# Patient Record
Sex: FEMALE | Race: WHITE | Employment: FULL TIME | URBAN - METROPOLITAN AREA
[De-identification: names, ages, dates, MRNs, and addresses within clinical notes are randomized per-mention and may not be internally consistent; named-entity substitution may affect disease eponyms.]

---

## 2018-04-18 ENCOUNTER — HOSPITAL ENCOUNTER (OUTPATIENT)
Age: 53
Discharge: HOME | End: 2018-04-18
Payer: COMMERCIAL

## 2018-04-18 PROCEDURE — G0480 DRUG TEST DEF 1-7 CLASSES: HCPCS

## 2018-04-18 PROCEDURE — 80307 DRUG TEST PRSMV CHEM ANLYZR: CPT

## 2018-04-19 PROBLEM — M48.061 SPINAL STENOSIS OF LUMBAR REGION: Status: ACTIVE | Noted: 2018-04-19

## 2018-04-20 LAB
ALCOHOL URINE: NOT DETECTED MG/DL
AMPHETAMINE SCREEN, URINE: NOT DETECTED
BARBITURATE SCREEN URINE: NOT DETECTED
BENZODIAZEPINE SCREEN, URINE: NOT DETECTED
CANNABINOID SCREEN URINE: NOT DETECTED
COCAINE METABOLITE SCREEN URINE: NOT DETECTED
METHADONE SCREEN, URINE: NOT DETECTED
OPIATE SCREEN URINE: POSITIVE
PHENCYCLIDINE SCREEN URINE: NOT DETECTED
PROPOXYPHENE SCREEN: NOT DETECTED

## 2018-04-26 LAB
6AM URINE: <10 NG/ML
CODEINE, URINE: 2301 NG/ML
HYDROCODONE, URINE: 28 NG/ML
HYDROMORPHONE, URINE: <20 NG/ML
MORPHINE URINE: 50 NG/ML
NORHYDROCODONE, URINE: 62 NG/ML
NOROXYCODONE, URINE: <20 NG/ML
NOROXYMORPHONE, URINE: <20 NG/ML
O-DESMETHYLTRAMADOL,UR: NEGATIVE
OXYCODONE, URINE CONFIRMATION: <20 NG/ML
OXYMORPHONE, URINE: <20 NG/ML
TRAMADOL, URINE: <50 NG/ML

## 2018-05-08 ENCOUNTER — OFFICE VISIT (OUTPATIENT)
Dept: FAMILY MEDICINE CLINIC | Age: 53
End: 2018-05-08
Payer: COMMERCIAL

## 2018-05-08 VITALS
HEART RATE: 88 BPM | SYSTOLIC BLOOD PRESSURE: 120 MMHG | OXYGEN SATURATION: 97 % | DIASTOLIC BLOOD PRESSURE: 80 MMHG | WEIGHT: 157 LBS | BODY MASS INDEX: 29.66 KG/M2

## 2018-05-08 DIAGNOSIS — Z12.39 SCREENING FOR BREAST CANCER: Primary | ICD-10-CM

## 2018-05-08 DIAGNOSIS — R12 HEARTBURN: ICD-10-CM

## 2018-05-08 DIAGNOSIS — N39.41 URGE INCONTINENCE OF URINE: ICD-10-CM

## 2018-05-08 DIAGNOSIS — I10 ESSENTIAL HYPERTENSION: ICD-10-CM

## 2018-05-08 DIAGNOSIS — M54.5 CHRONIC BILATERAL LOW BACK PAIN, WITH SCIATICA PRESENCE UNSPECIFIED: ICD-10-CM

## 2018-05-08 DIAGNOSIS — G89.29 CHRONIC BILATERAL LOW BACK PAIN, WITH SCIATICA PRESENCE UNSPECIFIED: ICD-10-CM

## 2018-05-08 PROCEDURE — 3017F COLORECTAL CA SCREEN DOC REV: CPT | Performed by: FAMILY MEDICINE

## 2018-05-08 PROCEDURE — 1036F TOBACCO NON-USER: CPT | Performed by: FAMILY MEDICINE

## 2018-05-08 PROCEDURE — 99214 OFFICE O/P EST MOD 30 MIN: CPT | Performed by: FAMILY MEDICINE

## 2018-05-08 PROCEDURE — G8427 DOCREV CUR MEDS BY ELIG CLIN: HCPCS | Performed by: FAMILY MEDICINE

## 2018-05-08 PROCEDURE — G8417 CALC BMI ABV UP PARAM F/U: HCPCS | Performed by: FAMILY MEDICINE

## 2018-05-08 RX ORDER — UREA 20 %
CREAM (GRAM) TOPICAL
Refills: 2 | Status: CANCELLED | OUTPATIENT
Start: 2018-05-08

## 2018-05-08 RX ORDER — FAMOTIDINE 20 MG/1
TABLET, FILM COATED ORAL
Qty: 60 TABLET | Refills: 5 | Status: SHIPPED | OUTPATIENT
Start: 2018-05-08 | End: 2018-11-06 | Stop reason: SDUPTHER

## 2018-05-08 RX ORDER — OXYBUTYNIN CHLORIDE 15 MG/1
15 TABLET, EXTENDED RELEASE ORAL DAILY
Qty: 30 TABLET | Refills: 5 | Status: SHIPPED | OUTPATIENT
Start: 2018-05-08 | End: 2018-10-26 | Stop reason: SDUPTHER

## 2018-05-08 RX ORDER — UREA 20 %
CREAM (GRAM) TOPICAL
Refills: 2 | COMMUNITY
Start: 2018-03-19

## 2018-05-08 RX ORDER — LISINOPRIL 10 MG/1
10 TABLET ORAL DAILY
Qty: 90 TABLET | Refills: 1 | Status: SHIPPED | OUTPATIENT
Start: 2018-05-08

## 2018-05-08 RX ORDER — GABAPENTIN 300 MG/1
300 CAPSULE ORAL 3 TIMES DAILY
Qty: 90 CAPSULE | Refills: 5 | Status: CANCELLED | OUTPATIENT
Start: 2018-05-08 | End: 2018-06-07

## 2018-05-08 RX ORDER — DIPHENOXYLATE HYDROCHLORIDE AND ATROPINE SULFATE 2.5; .025 MG/1; MG/1
TABLET ORAL
Qty: 30 TABLET | Refills: 3 | Status: SHIPPED | OUTPATIENT
Start: 2018-05-08 | End: 2019-05-08

## 2018-10-26 DIAGNOSIS — N39.41 URGE INCONTINENCE OF URINE: ICD-10-CM

## 2018-10-26 RX ORDER — OXYBUTYNIN CHLORIDE 15 MG/1
15 TABLET, EXTENDED RELEASE ORAL DAILY
Qty: 30 TABLET | Refills: 0 | Status: SHIPPED | OUTPATIENT
Start: 2018-10-26 | End: 2018-12-01 | Stop reason: SDUPTHER

## 2018-11-06 DIAGNOSIS — R12 HEARTBURN: ICD-10-CM

## 2018-11-07 RX ORDER — FAMOTIDINE 20 MG/1
TABLET, FILM COATED ORAL
Qty: 60 TABLET | Refills: 0 | Status: SHIPPED | OUTPATIENT
Start: 2018-11-07

## 2018-12-01 DIAGNOSIS — N39.41 URGE INCONTINENCE OF URINE: ICD-10-CM

## 2018-12-03 RX ORDER — OXYBUTYNIN CHLORIDE 15 MG/1
15 TABLET, EXTENDED RELEASE ORAL DAILY
Qty: 30 TABLET | Refills: 0 | Status: SHIPPED | OUTPATIENT
Start: 2018-12-03

## 2019-05-16 ENCOUNTER — APPOINTMENT (OUTPATIENT)
Dept: GENERAL RADIOLOGY | Age: 54
End: 2019-05-16
Payer: COMMERCIAL

## 2019-05-16 ENCOUNTER — HOSPITAL ENCOUNTER (EMERGENCY)
Age: 54
Discharge: HOME | End: 2019-05-16
Payer: COMMERCIAL

## 2019-05-16 VITALS
WEIGHT: 152 LBS | SYSTOLIC BLOOD PRESSURE: 142 MMHG | RESPIRATION RATE: 16 BRPM | HEIGHT: 61 IN | HEART RATE: 79 BPM | TEMPERATURE: 97.7 F | DIASTOLIC BLOOD PRESSURE: 89 MMHG | BODY MASS INDEX: 28.7 KG/M2 | OXYGEN SATURATION: 98 %

## 2019-05-16 DIAGNOSIS — S90.32XA CONTUSION OF LEFT FOOT, INITIAL ENCOUNTER: Primary | ICD-10-CM

## 2019-05-16 PROCEDURE — 99283 EMERGENCY DEPT VISIT LOW MDM: CPT

## 2019-05-16 PROCEDURE — 73630 X-RAY EXAM OF FOOT: CPT

## 2019-05-16 RX ORDER — ACETAMINOPHEN 500 MG
1000 TABLET ORAL EVERY 6 HOURS PRN
Qty: 60 TABLET | Refills: 0 | Status: SHIPPED | OUTPATIENT
Start: 2019-05-16 | End: 2019-05-30

## 2019-05-16 ASSESSMENT — PAIN DESCRIPTION - PAIN TYPE: TYPE: ACUTE PAIN

## 2019-05-16 ASSESSMENT — PAIN SCALES - GENERAL: PAINLEVEL_OUTOF10: 8

## 2019-05-16 ASSESSMENT — PAIN DESCRIPTION - ORIENTATION: ORIENTATION: LEFT

## 2019-05-16 ASSESSMENT — PAIN DESCRIPTION - FREQUENCY: FREQUENCY: CONTINUOUS

## 2019-05-16 ASSESSMENT — PAIN DESCRIPTION - LOCATION: LOCATION: FOOT

## 2019-05-16 ASSESSMENT — PAIN DESCRIPTION - DESCRIPTORS: DESCRIPTORS: ACHING

## 2019-05-16 NOTE — ED PROVIDER NOTES
[facility]     HPI:  5/16/19,   Time: 4:56 PM         Dilip Hutton is a 46 y.o. female presenting to the ED for left foot pain, beginning yesterday ago. The complaint has been constant, mild in severity, and worsened by nothing. States that she Kicked the cabinet last evening with the left foot complaining of pain to the left 5th toe. No obvious swelling or deformity is noted. Pedal pulses are palpable 2+ capillary refill at the 2nd. ROS:   Pertinent positives and negatives are stated within HPI, all other systems reviewed and are negative.  --------------------------------------------- PAST HISTORY ---------------------------------------------  Past Medical History:  has a past medical history of Coccyx pain, Fall, GERD (gastroesophageal reflux disease), Hyperparathyroidism ([address] Hypertension, IBS (irritable bowel syndrome), and OA (osteoarthritis of spine). Past Surgical History:  has a past surgical history that includes Bunionectomy (Bilateral); Colonoscopy ([id number]); Foot surgery (Bilateral); Tubal ligation; Upper gastrointestinal endoscopy (06/15/2017); Cholecystectomy; and Colonoscopy (10/16/2017). Social History:  reports that she has never smoked. She has never used smokeless tobacco. She reports that she drinks alcohol. She reports that she does not use drugs. Family History: family history includes Colon Cancer in her father; High Blood Pressure in her father and mother; Liver Cancer in her father; Other in her father and mother; Prostate Cancer in her father. The patients home medications have been reviewed. Allergies: Anaprox [naproxen]; Motrin [ibuprofen]; and Nsaids    -------------------------------------------------- RESULTS -------------------------------------------------  All laboratory and radiology results have been personally reviewed by myself   LABS:  No results found for this visit on 05/16/19.     RADIOLOGY:  Interpreted by Radiologist.  XR FOOT LEFT (MIN 3

## 2020-10-16 ENCOUNTER — HOSPITAL ENCOUNTER (OUTPATIENT)
Dept: PHYSICAL THERAPY | Age: 55
Setting detail: THERAPIES SERIES
Discharge: HOME | End: 2020-10-16
Payer: COMMERCIAL

## 2020-10-16 PROCEDURE — 97161 PT EVAL LOW COMPLEX 20 MIN: CPT | Performed by: PHYSICAL THERAPIST

## 2020-10-16 ASSESSMENT — PAIN DESCRIPTION - PAIN TYPE: TYPE: CHRONIC PAIN

## 2020-10-16 ASSESSMENT — PAIN DESCRIPTION - LOCATION: LOCATION: BACK;LEG

## 2020-10-16 ASSESSMENT — PAIN DESCRIPTION - ORIENTATION: ORIENTATION: RIGHT;LEFT

## 2020-10-16 ASSESSMENT — PAIN DESCRIPTION - PROGRESSION: CLINICAL_PROGRESSION: GRADUALLY WORSENING

## 2020-10-16 ASSESSMENT — PAIN DESCRIPTION - DESCRIPTORS: DESCRIPTORS: CONSTANT

## 2020-10-16 ASSESSMENT — PAIN SCALES - GENERAL: PAINLEVEL_OUTOF10: 9

## 2020-10-16 NOTE — PROGRESS NOTES
[address] [facility]                Phone: [phone]  Fax: [phone]    Physical Therapy Daily Treatment Note  Date:  10/16/2020    Patient Name:  Mia Roper    :  1965  MRN: [id number]    Restrictions/Precautions:    Diagnosis:  Spinal stenosis of lumbar region  Treatment Diagnosis:    Insurance/Certification information:  [facility]  Referring Physician:  Jailene Sanchez Do  Plan of care signed (Y/N):    Visit# / total visits:  -  Pain level: 910   Time In:  1503  Time Out:  1530    Subjective:  See evaluation    Exercises:  Exercise/Equipment Resistance/Repetitions Other comments    recumbent stepper        Trunk stretch with ball       Sciatic nerve glides       Hamstring stretch       Piriformis stretch       Lower trunk rotation       Pelvic tilts                                                                                              Other Therapeutic Activities:  PT evaluation completed    Home Exercise Program:  N/A    Manual Treatments:  N/A    Modalities:  IFC/HP    Timed Code Treatment Minutes:  0    Total Treatment Minutes:  27    Treatment/Activity Tolerance:  [] Patient tolerated treatment well [] Patient limited by fatigue  [x] Patient limited by pain  [] Patient limited by other medical complications  [] Other:     Prognosis: [] Good [x] Fair  [] Poor    Patient Requires Follow-up: [x] Yes  [] No    Plan:   [] Continue per plan of care [] Alter current plan (see comments)  [x] Plan of care initiated [] Hold pending MD visit [] Discharge  Plan for Next Session:        Electronically signed by:  Alissa Todd, PT [id number]

## 2020-10-16 NOTE — PROGRESS NOTES
Physical Therapy  Initial Assessment  Date: 10/16/2020  Patient Name: Rc Yusuf  MRN: [id number]  : 1965     Subjective   General  Additional Pertinent Hx: Pt with h/o chronic low back pain for many years. Pt reports worsening of symptoms of the past month with pain now radiating down the LEs. Pt reports MRI from about 1 year ago revealed spinal stenosis in the lumbar spine. Referring Practitioner: Shelby Gilliland DO  Referral Date : 10/12/20  Diagnosis: Spinal stenosis of lumbar region  PT Visit Information  PT Insurance Information: [employer]  Subjective  Subjective: Pt reports pain across the low back which radiates down into the B buttock, down the posterior LEs into the toes, and also around to the fron of the thighs. She reports numbness in the B toes as well. She reports poor quality of sleep due to pain. Pain Screening  Patient Currently in Pain: Yes  Pain Assessment  Pain Assessment: 0-10  Pain Level: 9  Pain Type: Chronic pain  Pain Location: Back;Leg  Pain Orientation: Right;Left  Pain Descriptors: Constant  Clinical Progression: Gradually worsening  Vital Signs  Patient Currently in Pain: Yes    Social/Functional History  Social/Functional History  Occupation: Full time employment  Type of occupation: Works at [employer]    Objective     AROM RLE (degrees)  RLE AROM: WFL  AROM LLE (degrees)  LLE AROM : WFL  Spine  Lumbar: WFL except flexion grossly 80°    Strength RLE  Strength RLE: WFL  Strength LLE  Strength LLE: WFL  Strength Other  Other: Core 4-/5     Additional Measures  Flexibility: Pt demonstrates tightness throughout the B hips (hamstrings, gluts, piriformis)  Special Tests: (+) SLR/neural tension test B LE  Other: Increased pain with manual lumbar traction     Assessment   Conditions Requiring Skilled Therapeutic Intervention  Body structures, Functions, Activity limitations: Decreased ROM; Decreased strength;Decreased endurance; Increased pain  Prognosis: Fair  Decision Making: Low Complexity  REQUIRES PT FOLLOW UP: Yes  Activity Tolerance  Activity Tolerance: Patient limited by pain         Plan   Plan  Times per week: 3x/week  Plan weeks: 4-6 weeks  Current Treatment Recommendations: Strengthening, ROM, Endurance Training, Neuromuscular Re-education, Manual Therapy - Soft Tissue Mobilization, Pain Management, Modalities, Patient/Caregiver Education & Training, Home Exercise Program      Goals  Short term goals  Time Frame for Short term goals: 2-3 weeks/6-9 visits  Short term goal 1: Decrease c/o pain to 5-7/10  Short term goal 2: Increase RoM lumbar flexion by 5° to 10°  Short term goal 3: Increase core strength by 1/2 MMt grade  Short term goal 4: Pt will demonstrate good tolerance and compliance to HEP  Long term goals  Time Frame for Long term goals : 4-6 weeks/12-18 visits  Long term goal 1: Decrease c/o pain to 3-4/10  Long term goal 2: Increase ROM lumbar flexion to > 90°  Long term goal 3: Increase flexibility B hips to [facility]  Long term goal 4: Increase core strength to 4+/5  Long term goal 5: Pt will reports improved quality od sleep due to decreased pain  Patient Goals   Patient goals : To decrease pain       Therapy Time   Individual Concurrent Group Co-treatment   Time In 1503         Time Out 1530         Minutes 27         Timed Code Treatment Minutes: [address], [place] [address]  If you have any questions or concerns, please don't hesitate to call.   Thank you for your referral.    Physician Signature:________________________________Date:__________________  By signing above, therapists plan is approved by physician

## 2020-10-19 ENCOUNTER — HOSPITAL ENCOUNTER (OUTPATIENT)
Dept: PHYSICAL THERAPY | Age: 55
Setting detail: THERAPIES SERIES
Discharge: HOME | End: 2020-10-19
Payer: COMMERCIAL

## 2020-10-19 PROCEDURE — 97110 THERAPEUTIC EXERCISES: CPT

## 2020-10-21 ENCOUNTER — HOSPITAL ENCOUNTER (OUTPATIENT)
Dept: PHYSICAL THERAPY | Age: 55
Setting detail: THERAPIES SERIES
Discharge: HOME | End: 2020-10-21
Payer: COMMERCIAL

## 2020-10-21 PROCEDURE — 97110 THERAPEUTIC EXERCISES: CPT

## 2020-10-21 PROCEDURE — G0283 ELEC STIM OTHER THAN WOUND: HCPCS

## 2020-10-23 ENCOUNTER — HOSPITAL ENCOUNTER (OUTPATIENT)
Dept: PHYSICAL THERAPY | Age: 55
Setting detail: THERAPIES SERIES
Discharge: HOME | End: 2020-10-23
Payer: COMMERCIAL

## 2020-10-23 PROCEDURE — 97110 THERAPEUTIC EXERCISES: CPT | Performed by: PHYSICAL THERAPIST

## 2020-10-23 PROCEDURE — G0283 ELEC STIM OTHER THAN WOUND: HCPCS | Performed by: PHYSICAL THERAPIST

## 2020-10-23 NOTE — PROGRESS NOTES
[address] [facility]                Phone: [phone]  Fax: [phone]    Physical Therapy Daily Treatment Note  Date:  10/23/2020    Patient Name:  Gerhard Livingston    :  1965  MRN: [id number]    Restrictions/Precautions:    Diagnosis:  Spinal stenosis of lumbar region  Treatment Diagnosis:    Insurance/Certification information:  [facility]  Referring Physician:  Chris Del Castillo Do  Plan of care signed (Y/N):    Visit# / total visits:  -  Pain level: 9/10   Time In:  1429  Time Out:  1515    Subjective:  Pt reports numbness in B feet today.     Exercises:  Exercise/Equipment Resistance/Repetitions Other comments    recumbent stepper  X 10 mins      Trunk stretch with ball 20 sec x 3 reps       Sciatic nerve glides X 10 reps       Hamstring stretch 20 sec x 3 reps B       Piriformis stretch 20 sec x 3 reps B       Lower trunk rotation 15 sec x 5 reps       Pelvic tilts 5 sec x 10 reps                                                                                              Other Therapeutic Activities:  N/A    Home Exercise Program:  N/A    Manual Treatments:  N/A    Modalities:  IFC/HP x 15 mins to LB    Timed Code Treatment Minutes: 31    Total Treatment Minutes:  46    Treatment/Activity Tolerance:  [] Patient tolerated treatment well [] Patient limited by fatigue  [x] Patient limited by pain  [] Patient limited by other medical complications  [] Other:       Prognosis: [] Good [x] Fair  [] Poor    Patient Requires Follow-up: [x] Yes  [] No    Plan:   [x] Continue per plan of care [] Alter current plan (see comments)  [] Plan of care initiated [] Hold pending MD visit [] Discharge  Plan for Next Session:         Electronically signed by:  Erik Novak, PT [id number]

## 2020-10-26 ENCOUNTER — HOSPITAL ENCOUNTER (OUTPATIENT)
Dept: PHYSICAL THERAPY | Age: 55
Setting detail: THERAPIES SERIES
Discharge: HOME | End: 2020-10-26
Payer: COMMERCIAL

## 2020-10-26 PROCEDURE — 97110 THERAPEUTIC EXERCISES: CPT

## 2020-10-26 PROCEDURE — G0283 ELEC STIM OTHER THAN WOUND: HCPCS

## 2020-10-28 ENCOUNTER — HOSPITAL ENCOUNTER (OUTPATIENT)
Dept: PHYSICAL THERAPY | Age: 55
Setting detail: THERAPIES SERIES
Discharge: HOME | End: 2020-10-28
Payer: COMMERCIAL

## 2020-10-28 PROCEDURE — 97110 THERAPEUTIC EXERCISES: CPT | Performed by: PHYSICAL THERAPIST

## 2020-10-28 PROCEDURE — G0283 ELEC STIM OTHER THAN WOUND: HCPCS | Performed by: PHYSICAL THERAPIST

## 2020-10-30 ENCOUNTER — HOSPITAL ENCOUNTER (OUTPATIENT)
Dept: PHYSICAL THERAPY | Age: 55
Setting detail: THERAPIES SERIES
Discharge: HOME | End: 2020-10-30
Payer: COMMERCIAL

## 2020-10-30 PROCEDURE — G0283 ELEC STIM OTHER THAN WOUND: HCPCS | Performed by: PHYSICAL THERAPIST

## 2020-10-30 PROCEDURE — 97110 THERAPEUTIC EXERCISES: CPT | Performed by: PHYSICAL THERAPIST

## 2020-10-30 NOTE — PROGRESS NOTES
[address] [facility]                Phone: [phone]  Fax: [phone]    Physical Therapy Daily Treatment Note  Date:  10/30/2020    Patient Name:  Cheyenne Brown    :  1965  MRN: [id number]    Restrictions/Precautions:    Diagnosis:  Spinal stenosis of lumbar region  Treatment Diagnosis:    Insurance/Certification information:  [facility]  Referring Physician:  Kishor Bullock Do  Plan of care signed (Y/N):    Visit# / total visits:  -  Pain level: 9/10   Time In:  1429  Time Out:  1520    Subjective:  Pt with no new report.     Exercises:  Exercise/Equipment Resistance/Repetitions Other comments    recumbent stepper X 10 mins      Trunk stretch with ball 20 sec x 3 reps       Sciatic nerve glides X 10 reps       Hamstring stretch 20 sec x 3 reps B       Piriformis stretch 20 sec x 3 reps B       Lower trunk rotation 15 sec x 5 reps       Pelvic tilts 5 sec x 10 reps                                                                                              Other Therapeutic Activities:  N/A    Home Exercise Program:  N/A    Manual Treatments:  N/A    Modalities:  IFC/HP x 15 mins to LB    Timed Code Treatment Minutes: 35    Total Treatment Minutes:  51    Treatment/Activity Tolerance:  [] Patient tolerated treatment well [] Patient limited by fatigue  [x] Patient limited by pain  [] Patient limited by other medical complications  [] Other:       Prognosis: [] Good [x] Fair  [] Poor    Patient Requires Follow-up: [x] Yes  [] No    Plan:   [x] Continue per plan of care [] Alter current plan (see comments)  [] Plan of care initiated [] Hold pending MD visit [] Discharge  Plan for Next Session:         Electronically signed by:  Stephanie Gates, PT [id number]

## 2020-11-03 ENCOUNTER — HOSPITAL ENCOUNTER (OUTPATIENT)
Dept: PHYSICAL THERAPY | Age: 55
Setting detail: THERAPIES SERIES
Discharge: HOME | End: 2020-11-03
Payer: COMMERCIAL

## 2020-11-03 PROCEDURE — G0283 ELEC STIM OTHER THAN WOUND: HCPCS | Performed by: PHYSICAL THERAPIST

## 2020-11-03 PROCEDURE — 97110 THERAPEUTIC EXERCISES: CPT | Performed by: PHYSICAL THERAPIST

## 2020-11-03 NOTE — PROGRESS NOTES
[address] [facility]                Phone: [phone]  Fax: [phone]    Physical Therapy Daily Treatment Note  Date:  11/3/2020    Patient Name:  Yuliet Holman    :  1965  MRN: [id number]    Restrictions/Precautions:    Diagnosis:  Spinal stenosis of lumbar region  Treatment Diagnosis:    Insurance/Certification information:  [facility]  Referring Physician:  Mony Whiting Do  Plan of care signed (Y/N):    Visit# / total visits:  -  Pain level: 9/10   Time In:  1505  Time Out:  150    Subjective:  Pt reports that she is having pain in her right knee, cause unknown.     Exercises:  Exercise/Equipment Resistance/Repetitions Other comments    recumbent stepper X 10 mins      Trunk stretch with ball 20 sec x 3 reps       Sciatic nerve glides X 10 reps       Hamstring stretch 20 sec x 3 reps B       Piriformis stretch 20 sec x 3 reps B       Lower trunk rotation 15 sec x 5 reps       Pelvic tilts 5 sec x 10 reps                                                                                              Other Therapeutic Activities:  N/A    Home Exercise Program:  N/A    Manual Treatments:  N/A    Modalities:  IFC/HP x 15 mins to LB    Timed Code Treatment Minutes:  30    Total Treatment Minutes:  45    Treatment/Activity Tolerance:  [] Patient tolerated treatment well [] Patient limited by fatigue  [x] Patient limited by pain  [] Patient limited by other medical complications  [x] Other: antalgic noted noted today due to c/o knee pain      Prognosis: [] Good [x] Fair  [] Poor    Patient Requires Follow-up: [x] Yes  [] No    Plan:   [x] Continue per plan of care [] Alter current plan (see comments)  [] Plan of care initiated [] Hold pending MD visit [] Discharge  Plan for Next Session:         Electronically signed by:  Armin Felty, PT [id number]

## 2020-11-06 ENCOUNTER — HOSPITAL ENCOUNTER (OUTPATIENT)
Dept: PHYSICAL THERAPY | Age: 55
Setting detail: THERAPIES SERIES
Discharge: HOME | End: 2020-11-06
Payer: COMMERCIAL

## 2020-11-06 PROCEDURE — G0283 ELEC STIM OTHER THAN WOUND: HCPCS | Performed by: PHYSICAL THERAPIST

## 2020-11-06 PROCEDURE — 97110 THERAPEUTIC EXERCISES: CPT | Performed by: PHYSICAL THERAPIST

## 2020-11-06 NOTE — PROGRESS NOTES
[address] [facility]                Phone: [phone]  Fax: [phone]    Physical Therapy Daily Treatment Note  Date:  2020    Patient Name:  Zelalem Granado    :  1965  MRN: [id number]    Restrictions/Precautions:    Diagnosis:  Spinal stenosis of lumbar region  Treatment Diagnosis:    Insurance/Certification information:  [facility]  Referring Physician:  Janee Mckeon Do  Plan of care signed (Y/N):    Visit# / total visits:  -  Pain level: 9/10   Time In:  1430  Time Out:  1520    Subjective:  Pt reports knee still hurts.     Exercises:  Exercise/Equipment Resistance/Repetitions Other comments    recumbent stepper X 10 mins      Trunk stretch with ball 20 sec x 3 reps       Sciatic nerve glides X 10 reps       Hamstring stretch 20 sec x 3 reps B       Piriformis stretch 20 sec x 3 reps B       Lower trunk rotation 15 sec x 5 reps       Pelvic tilts 5 sec x 10 reps                                                                                              Other Therapeutic Activities:  N/A    Home Exercise Program:  N/A    Manual Treatments:  N/A    Modalities:  IFC/HP x 15 mins to LB    Timed Code Treatment Minutes:  35    Total Treatment Minutes:  50    Treatment/Activity Tolerance:  [] Patient tolerated treatment well [] Patient limited by fatigue  [x] Patient limited by pain  [] Patient limited by other medical complications  [] Other:       Prognosis: [] Good [x] Fair  [] Poor    Patient Requires Follow-up: [x] Yes  [] No    Plan:   [x] Continue per plan of care [] Alter current plan (see comments)  [] Plan of care initiated [] Hold pending MD visit [] Discharge  Plan for Next Session:         Electronically signed by:  Jessica Luke, PT [id number]

## 2020-11-11 ENCOUNTER — HOSPITAL ENCOUNTER (OUTPATIENT)
Dept: PHYSICAL THERAPY | Age: 55
Setting detail: THERAPIES SERIES
Discharge: HOME | End: 2020-11-11
Payer: COMMERCIAL

## 2020-11-11 PROCEDURE — G0283 ELEC STIM OTHER THAN WOUND: HCPCS

## 2020-11-11 PROCEDURE — 97110 THERAPEUTIC EXERCISES: CPT

## 2020-11-11 NOTE — PROGRESS NOTES
[address] [facility]                Phone: [phone]  Fax: [phone]    Physical Therapy Daily Treatment Note  Date:  2020    Patient Name:  Mamadou Pool    :  1965  MRN: [id number]    Restrictions/Precautions:    Diagnosis:  Spinal stenosis of lumbar region  Treatment Diagnosis:    Insurance/Certification information:  [facility]  Referring Physician:  Nata Ng Do  Plan of care signed (Y/N):    Visit# / total visits:  10/12-  Pain level: 9/10  L Knee and LBP  Time In:  1424  Time Out:  1512    Subjective:  Pt reports L knee and LBP continue to hinder pt's functional mobility , and endurance.      Exercises:  Exercise/Equipment Resistance/Repetitions Other comments    recumbent stepper X 10 mins Pt declined d/t L knee pain      Trunk stretch with ball 20 sec x 3 reps       Sciatic nerve glides X 10 reps       Hamstring stretch 20 sec x 3 reps B       Piriformis stretch 20 sec x 3 reps B       Lower trunk rotation 15 sec x 5 reps       Pelvic tilts 5 sec x 10 reps                                                                                              Other Therapeutic Activities:  N/A    Home Exercise Program:  N/A    Manual Treatments:  N/A    Modalities:  IFC/HP x 15 mins to LB    Timed Code Treatment Minutes: 32     Total Treatment Minutes: 47     Treatment/Activity Tolerance:  [] Patient tolerated treatment well [] Patient limited by fatigue  [x] Patient limited by pain  [] Patient limited by other medical complications  [] Other:       Prognosis: [] Good [x] Fair  [] Poor    Patient Requires Follow-up: [x] Yes  [] No    Plan:   [x] Continue per plan of care [] Alter current plan (see comments)  [] Plan of care initiated [] Hold pending MD visit [] Discharge  Plan for Next Session:         Electronically signed by:  Jared Warner, PTA [id number]

## 2020-11-13 ENCOUNTER — APPOINTMENT (OUTPATIENT)
Dept: PHYSICAL THERAPY | Age: 55
End: 2020-11-13
Payer: COMMERCIAL

## 2020-11-16 ENCOUNTER — HOSPITAL ENCOUNTER (OUTPATIENT)
Dept: PHYSICAL THERAPY | Age: 55
Setting detail: THERAPIES SERIES
Discharge: HOME | End: 2020-11-16
Payer: COMMERCIAL

## 2020-11-16 PROCEDURE — 97110 THERAPEUTIC EXERCISES: CPT

## 2020-11-16 PROCEDURE — G0283 ELEC STIM OTHER THAN WOUND: HCPCS

## 2020-11-16 NOTE — PROGRESS NOTES
[address] [facility]                Phone: [phone]  Fax: [phone]    Physical Therapy Daily Treatment Note  Date:  2020    Patient Name:  Tosha Cook    :  1965  MRN: [id number]    Restrictions/Precautions:    Diagnosis:  Spinal stenosis of lumbar region  Treatment Diagnosis:    Insurance/Certification information:  [facility]  Referring Physician:  Delphine Ralph Do  Plan of care signed (Y/N):    Visit# / total visits:  -  Pain level: 9/10   LBP  Time In:  1430  Time Out:  1520    Subjective:  Pt reports LBP continue to hinder pt's functional mobility , and endurance. Exercises:  Exercise/Equipment Resistance/Repetitions Other comments    recumbent stepper X 10 mins      Trunk stretch with ball 20 sec x 3 reps       Sciatic nerve glides X 10 reps       Hamstring stretch 20 sec x 3 reps B       Piriformis stretch 20 sec x 3 reps B       Lower trunk rotation 15 sec x 5 reps       Pelvic tilts 5 sec x 10 reps                                                                                              Other :  Functional mobility is guarded d/t pain.  Pt has had no reduction in pain since initial eval.     Home Exercise Program:  Sciatic nerve glides, piriformis stretch , lower trunk rotation    Manual Treatments:  N/A    Modalities:  IFC/HP x 15 mins to LB    Timed Code Treatment Minutes: 35     Total Treatment Minutes: 50    Treatment/Activity Tolerance:  [] Patient tolerated treatment well [] Patient limited by fatigue  [x] Patient limited by pain  [] Patient limited by other medical complications  [] Other:       Prognosis: [] Good [x] Fair  [] Poor    Patient Requires Follow-up: [x] Yes  [] No    Plan:   [x] Continue per plan of care [] Alter current plan (see comments)  [] Plan of care initiated [] Hold pending MD visit [] Discharge    Plan for Next Session:   Will finish out appointments for the the week and discharge w/ HEP for management of symptoms Electronically signed by:  Kiesha Aranda, PTA [id number]

## 2020-11-17 ENCOUNTER — HOSPITAL ENCOUNTER (OUTPATIENT)
Dept: PHYSICAL THERAPY | Age: 55
Setting detail: THERAPIES SERIES
Discharge: HOME | End: 2020-11-17
Payer: COMMERCIAL

## 2020-11-17 NOTE — PROGRESS NOTES
[address] [facility]                Phone: [phone]  Fax: [phone]    Physical Therapy  Cancellation/No-show Note  Patient Name:  Regi Dalal  :  1965   Date:  2020    For today's appointment patient:  [x]  Cancelled  []  Rescheduled appointment  []  No-show     Reason given by patient:  []  Patient ill  []  Conflicting appointment  []  No transportation    []  Conflict with work  []  No reason given  [x]  Other:     Comments:  Pt had concerns about the weather condition, pt takes the bus and has a distance to walk from the drop off point  Electronically signed by:  Dorothy Ryan PTA [id number]

## 2020-11-18 ENCOUNTER — HOSPITAL ENCOUNTER (OUTPATIENT)
Dept: PHYSICAL THERAPY | Age: 55
Setting detail: THERAPIES SERIES
Discharge: HOME | End: 2020-11-18
Payer: COMMERCIAL

## 2020-11-18 PROCEDURE — G0283 ELEC STIM OTHER THAN WOUND: HCPCS

## 2020-11-18 PROCEDURE — 97110 THERAPEUTIC EXERCISES: CPT

## 2020-11-18 NOTE — PROGRESS NOTES
and discharge w/ HEP for management of symptoms. Pt is in agreement with this course of action.  She reported she has a follow up w/ pain management Dr. Farideh Ellis on December 2, 2020         Electronically signed by:  Erich Foy, PTA [id number]

## 2020-11-23 ENCOUNTER — HOSPITAL ENCOUNTER (OUTPATIENT)
Dept: PHYSICAL THERAPY | Age: 55
Setting detail: THERAPIES SERIES
Discharge: HOME | End: 2020-11-23
Payer: COMMERCIAL

## 2020-11-23 PROCEDURE — 97110 THERAPEUTIC EXERCISES: CPT

## 2020-11-23 PROCEDURE — G0283 ELEC STIM OTHER THAN WOUND: HCPCS

## 2020-11-23 NOTE — PROGRESS NOTES
[address] [facility]                Phone: [phone]  Fax: [phone]    Physical Therapy Daily Treatment Note  Date:  2020    Patient Name:  Lander Libman    :  1965  MRN: [id number]    Restrictions/Precautions:    Diagnosis:  Spinal stenosis of lumbar region  Treatment Diagnosis:    Insurance/Certification information:  [facility]  Referring Physician:  Mark Scott Do  Plan of care signed (Y/N):    Visit# / total visits:  -  Pain level: 9/10   LBP and L knee ( non radiating )   Time In:  1429  Time Out:  1525    Subjective:  Pt continues to report LBP continue to hinder pt's functional mobility , and endurance. Pt arrived after her work shift. Exercises:  Exercise/Equipment Resistance/Repetitions Other comments    recumbent stepper X 10 mins      Trunk stretch with ball 20 sec x 3 reps       Sciatic nerve glides X 10 reps       Hamstring stretch 20 sec x 3 reps B       Piriformis stretch 20 sec x 3 reps B       Lower trunk rotation 15 sec x 5 reps       Pelvic tilts 5 sec x 10 reps                                                                                              Other :  Functional mobility is guarded d/t pain.  Pt has had no reduction in pain since initial eval.     Home Exercise Program:  Sciatic nerve glides, piriformis stretch , lower trunk rotation    Manual Treatments:  N/A    Modalities:  IFC/HP x 15 mins to LB    Timed Code Treatment Minutes: 41    Total Treatment Minutes: 56    Treatment/Activity Tolerance:  [] Patient tolerated treatment well [] Patient limited by fatigue  [x] Patient limited by pain  [] Patient limited by other medical complications  [] Other:       Prognosis: [] Good [x] Fair  [] Poor    Patient Requires Follow-up: [x] Yes  [] No    Plan:   [x] Continue per plan of care [] Alter current plan (see comments)  [] Plan of care initiated [] Hold pending MD visit [] Discharge    Plan for Next Session:   Will finish one additional appointment next and discharge w/ HEP for management of symptoms. Pt is in agreement with this course of action.  She reported she has a follow up w/ pain management Dr. Jules Pineda on December 2, 2020         Electronically signed by:  Victorino Hernandes, PTA [id number]

## 2020-11-25 ENCOUNTER — HOSPITAL ENCOUNTER (OUTPATIENT)
Dept: PHYSICAL THERAPY | Age: 55
Setting detail: THERAPIES SERIES
Discharge: HOME | End: 2020-11-25
Payer: COMMERCIAL

## 2020-11-25 NOTE — PROGRESS NOTES
[address] [facility]                Phone: [phone]  Fax: [phone]    Physical Therapy  Cancellation/No-show Note  Patient Name:  Xander Jefferson  :  1965   Date:  2020    For today's appointment patient:  [x]  Cancelled  []  Rescheduled appointment  []  No-show     Reason given by patient:  []  Patient ill  []  Conflicting appointment  []  No transportation    []  Conflict with work  []  No reason given  []  Other:     Comments:  Had too much to do to get ready for Thanksgiving    Electronically signed by:  Sulma Chavez PTA [id number]

## 2020-11-30 ENCOUNTER — HOSPITAL ENCOUNTER (EMERGENCY)
Age: 55
Discharge: HOME | End: 2020-11-30
Attending: EMERGENCY MEDICINE
Payer: COMMERCIAL

## 2020-11-30 ENCOUNTER — APPOINTMENT (OUTPATIENT)
Dept: CT IMAGING | Age: 55
End: 2020-11-30
Payer: COMMERCIAL

## 2020-11-30 VITALS
DIASTOLIC BLOOD PRESSURE: 63 MMHG | TEMPERATURE: 97.8 F | SYSTOLIC BLOOD PRESSURE: 120 MMHG | OXYGEN SATURATION: 98 % | HEART RATE: 71 BPM | RESPIRATION RATE: 20 BRPM

## 2020-11-30 LAB
ALBUMIN SERPL-MCNC: 4.1 G/DL (ref 3.5–5.2)
ALP BLD-CCNC: 124 U/L (ref 35–104)
ALT SERPL-CCNC: 66 U/L (ref 0–32)
ANION GAP SERPL CALCULATED.3IONS-SCNC: 12 MMOL/L (ref 7–16)
AST SERPL-CCNC: 68 U/L (ref 0–31)
BACTERIA: ABNORMAL /HPF
BASOPHILS ABSOLUTE: 0.04 E9/L (ref 0–0.2)
BASOPHILS RELATIVE PERCENT: 0.5 % (ref 0–2)
BILIRUB SERPL-MCNC: 0.3 MG/DL (ref 0–1.2)
BILIRUBIN URINE: NEGATIVE
BLOOD, URINE: NEGATIVE
BUN BLDV-MCNC: 10 MG/DL (ref 6–20)
CALCIUM SERPL-MCNC: 9.3 MG/DL (ref 8.6–10.2)
CHLORIDE BLD-SCNC: 102 MMOL/L (ref 98–107)
CLARITY: CLEAR
CO2: 29 MMOL/L (ref 22–29)
COLOR: YELLOW
CREAT SERPL-MCNC: 0.6 MG/DL (ref 0.5–1)
EOSINOPHILS ABSOLUTE: 0.17 E9/L (ref 0.05–0.5)
EOSINOPHILS RELATIVE PERCENT: 1.9 % (ref 0–6)
GFR AFRICAN AMERICAN: >60
GFR NON-AFRICAN AMERICAN: >60 ML/MIN/1.73
GLUCOSE BLD-MCNC: 82 MG/DL (ref 74–99)
GLUCOSE URINE: NEGATIVE MG/DL
HCT VFR BLD CALC: 43 % (ref 34–48)
HEMOGLOBIN: 13.7 G/DL (ref 11.5–15.5)
IMMATURE GRANULOCYTES #: 0.05 E9/L
IMMATURE GRANULOCYTES %: 0.6 % (ref 0–5)
KETONES, URINE: NEGATIVE MG/DL
LACTIC ACID: 0.8 MMOL/L (ref 0.5–2.2)
LEUKOCYTE ESTERASE, URINE: ABNORMAL
LIPASE: 12 U/L (ref 13–60)
LYMPHOCYTES ABSOLUTE: 2.43 E9/L (ref 1.5–4)
LYMPHOCYTES RELATIVE PERCENT: 27.8 % (ref 20–42)
MCH RBC QN AUTO: 29.5 PG (ref 26–35)
MCHC RBC AUTO-ENTMCNC: 31.9 % (ref 32–34.5)
MCV RBC AUTO: 92.7 FL (ref 80–99.9)
MONOCYTES ABSOLUTE: 0.69 E9/L (ref 0.1–0.95)
MONOCYTES RELATIVE PERCENT: 7.9 % (ref 2–12)
NEUTROPHILS ABSOLUTE: 5.36 E9/L (ref 1.8–7.3)
NEUTROPHILS RELATIVE PERCENT: 61.3 % (ref 43–80)
NITRITE, URINE: NEGATIVE
PDW BLD-RTO: 12.6 FL (ref 11.5–15)
PH UA: 5.5 (ref 5–9)
PLATELET # BLD: 233 E9/L (ref 130–450)
PMV BLD AUTO: 10.4 FL (ref 7–12)
POTASSIUM SERPL-SCNC: 3.5 MMOL/L (ref 3.5–5)
PROTEIN UA: NEGATIVE MG/DL
RBC # BLD: 4.64 E12/L (ref 3.5–5.5)
RBC UA: ABNORMAL /HPF (ref 0–2)
SODIUM BLD-SCNC: 143 MMOL/L (ref 132–146)
SPECIFIC GRAVITY UA: 1.02 (ref 1–1.03)
TOTAL PROTEIN: 7.7 G/DL (ref 6.4–8.3)
UROBILINOGEN, URINE: 0.2 E.U./DL
WBC # BLD: 8.7 E9/L (ref 4.5–11.5)
WBC UA: ABNORMAL /HPF (ref 0–5)

## 2020-11-30 PROCEDURE — 6360000002 HC RX W HCPCS: Performed by: EMERGENCY MEDICINE

## 2020-11-30 PROCEDURE — 2580000003 HC RX 258: Performed by: EMERGENCY MEDICINE

## 2020-11-30 PROCEDURE — 96374 THER/PROPH/DIAG INJ IV PUSH: CPT

## 2020-11-30 PROCEDURE — 74177 CT ABD & PELVIS W/CONTRAST: CPT

## 2020-11-30 PROCEDURE — 80053 COMPREHEN METABOLIC PANEL: CPT

## 2020-11-30 PROCEDURE — 2580000003 HC RX 258: Performed by: INTERNAL MEDICINE

## 2020-11-30 PROCEDURE — 83690 ASSAY OF LIPASE: CPT

## 2020-11-30 PROCEDURE — 6360000004 HC RX CONTRAST MEDICATION: Performed by: INTERNAL MEDICINE

## 2020-11-30 PROCEDURE — 99283 EMERGENCY DEPT VISIT LOW MDM: CPT

## 2020-11-30 PROCEDURE — 85025 COMPLETE CBC W/AUTO DIFF WBC: CPT

## 2020-11-30 PROCEDURE — 83605 ASSAY OF LACTIC ACID: CPT

## 2020-11-30 PROCEDURE — 81001 URINALYSIS AUTO W/SCOPE: CPT

## 2020-11-30 RX ORDER — SODIUM CHLORIDE 0.9 % (FLUSH) 0.9 %
10 SYRINGE (ML) INJECTION ONCE
Status: COMPLETED | OUTPATIENT
Start: 2020-11-30 | End: 2020-11-30

## 2020-11-30 RX ORDER — FENTANYL CITRATE 50 UG/ML
50 INJECTION, SOLUTION INTRAMUSCULAR; INTRAVENOUS ONCE
Status: COMPLETED | OUTPATIENT
Start: 2020-11-30 | End: 2020-11-30

## 2020-11-30 RX ORDER — 0.9 % SODIUM CHLORIDE 0.9 %
500 INTRAVENOUS SOLUTION INTRAVENOUS ONCE
Status: COMPLETED | OUTPATIENT
Start: 2020-11-30 | End: 2020-11-30

## 2020-11-30 RX ADMIN — Medication 10 ML: at 19:33

## 2020-11-30 RX ADMIN — IOPAMIDOL 90 ML: 755 INJECTION, SOLUTION INTRAVENOUS at 19:33

## 2020-11-30 RX ADMIN — SODIUM CHLORIDE 500 ML: 9 INJECTION, SOLUTION INTRAVENOUS at 18:39

## 2020-11-30 RX ADMIN — FENTANYL CITRATE 50 MCG: 50 INJECTION, SOLUTION INTRAMUSCULAR; INTRAVENOUS at 18:39

## 2020-11-30 ASSESSMENT — PAIN SCALES - GENERAL: PAINLEVEL_OUTOF10: 6

## 2020-11-30 NOTE — ED NOTES
FIRST PROVIDER CONTACT ASSESSMENT NOTE      Department of Emergency Medicine   ED  First Provider Note   11/30/20  4:10 PM EST    Chief Complaint: Abdominal Pain (LLQ abd pain starting yesterday)      History of Present Illness:    Anjelica Byrd is a 63 y.o. female who presents to the ED by private car for left lower quadrant abdominal pain starting yesterday. Patient has a history of her gallbladder being removed which is her only surgery on her belly. Patient states she has had no vaginal bleeding  Focused Screening Exam:  Constitutional:  Alert, appears stated age and is in no distress. *ALLERGIES*     Anaprox [naproxen];  Motrin [ibuprofen]; and Nsaids     ED Triage Vitals   BP Temp Temp src Pulse Resp SpO2 Height Weight   11/30/20 1606 11/30/20 1424 -- 11/30/20 1424 11/30/20 1605 11/30/20 1424 -- --   (!) 142/95 97.8 °F (36.6 °C)  105 16 94 %          Initial Plan of Care:  Initiate Treatment-Testing, Proceed toTreatment Area When Bed Available for ED Attending/MLP to Continue Care    -----------------END OF FIRST PROVIDER CONTACT ASSESSMENT NOTE--------------  Electronically signed by Edgar Martínez PA-C   DD: 11/30/20       Edgar Martínez PA-C  11/30/20 4535

## 2020-11-30 NOTE — ED PROVIDER NOTES
[facility]  Department of Emergency Medicine   ED  Encounter Note  Admit Date/RoomTime: 2020  5:31 PM  ED Room: ED-SCR/SCR    NAME: Rip Vang  : 1965  MRN: [id number]    Chief Complaint: Abdominal Pain (LLQ abd pain starting yesterday)       History of Present Illness        Rip Vang is a 63 y.o. old female who presents to the emergency department for evaluation of left lower quadrant abdominal pain. It began yesterday. She denies any associated nausea, vomiting, diarrhea or constipation. She has no rectal bleeding. She denies  symptoms such as vaginal discharge, hematuria or dysuria. She has no prior history of diverticulosis or kidney stone. She does have remote history of cholecystectomy and thinks that she did have a colonoscopy as well with Dr. Larry Holbrook. I cannot find any records for a colonoscopy in our system. She denies fever or chills. She has had no recent trauma or heavy lifting. Norberto Ra ROS   Pertinent positives and negatives are stated within HPI, all other systems reviewed and are negative. Past Medical History:  has a past medical history of Coccyx pain, Fall, GERD (gastroesophageal reflux disease), Hyperparathyroidism ([address] Hypertension, IBS (irritable bowel syndrome), and OA (osteoarthritis of spine). Surgical History has a past surgical history that includes Bunionectomy (Bilateral); Colonoscopy ([id number]); Foot surgery (Bilateral); Tubal ligation; Upper gastrointestinal endoscopy (06/15/2017); Cholecystectomy; and Colonoscopy (10/16/2017). Social History:  reports that she has never smoked. She has never used smokeless tobacco. She reports current alcohol use. She reports that she does not use drugs. Family History: family history includes Colon Cancer in her father; High Blood Pressure in her father and mother; Liver Cancer in her father; Other in her father and mother; Prostate Cancer in her father.      Allergies: Anaprox [naproxen]; Motrin [ibuprofen]; and Nsaids    Physical Exam            ED Triage Vitals   BP Temp Temp src Pulse Resp SpO2 Height Weight   11/30/20 1606 11/30/20 1424 -- 11/30/20 1424 11/30/20 1605 11/30/20 1424 -- --   (!) 142/95 97.8 °F (36.6 °C)  105 16 94 %        Oxygen Saturation Interpretation: Normal.    General Appearance/Constitutional:  Alert, development consistent with age  [de-identified]:  NC/NT. PERRLA. Airway patent. Neck:  Supple. No lymphadenopathy. Respiratory:  No retractions. Lungs Clear to auscultation and breath sounds equal.  CV:  Regular rate and rhythm. GI:  normal appearing, non-distended with no visible hernias. Bowel sounds: normal bowel sounds. Tenderness: There is moderate tenderness present - located in the LLQ., There is no guarding. .    No inguinal hernia, no zoster rash         Liver: non-tender. Spleen:  non-tender. Back: CVA Tenderness: No.  Integument:  Normal turgor. Warm, dry, without visible rash, unless noted elsewhere. Lymphatics: No edema, cap.refill <3sec. Neurological:  Orientation age-appropriate. Motor functions intact.     Lab / Imaging Results   (All laboratory and radiology results have been personally reviewed by myself)  Labs:  Results for orders placed or performed during the hospital encounter of 11/30/20   Comprehensive Metabolic Panel   Result Value Ref Range    Sodium 143 132 - 146 mmol/L    Potassium 3.5 3.5 - 5.0 mmol/L    Chloride 102 98 - 107 mmol/L    CO2 29 22 - 29 mmol/L    Anion Gap 12 7 - 16 mmol/L    Glucose 82 74 - 99 mg/dL    BUN 10 6 - 20 mg/dL    CREATININE 0.6 0.5 - 1.0 mg/dL    GFR Non-African American >60 >=60 mL/min/1.73    GFR African American >60     Calcium 9.3 8.6 - 10.2 mg/dL    Total Protein 7.7 6.4 - 8.3 g/dL    Alb 4.1 3.5 - 5.2 g/dL    Total Bilirubin 0.3 0.0 - 1.2 mg/dL    Alkaline Phosphatase 124 (H) 35 - 104 U/L    ALT 66 (H) 0 - 32 U/L    AST 68 (H) 0 - 31 U/L   CBC Auto Differential   Result Value Ref Range    WBC 8.7 4.5 - 11.5 E9/L    RBC 4.64 3.50 - 5.50 E12/L    Hemoglobin 13.7 11.5 - 15.5 g/dL    Hematocrit 43.0 34.0 - 48.0 %    MCV 92.7 80.0 - 99.9 fL    MCH 29.5 26.0 - 35.0 pg    MCHC 31.9 (L) 32.0 - 34.5 %    RDW 12.6 11.5 - 15.0 fL    Platelets 710 780 - 628 E9/L    MPV 10.4 7.0 - 12.0 fL    Neutrophils % 61.3 43.0 - 80.0 %    Immature Granulocytes % 0.6 0.0 - 5.0 %    Lymphocytes % 27.8 20.0 - 42.0 %    Monocytes % 7.9 2.0 - 12.0 %    Eosinophils % 1.9 0.0 - 6.0 %    Basophils % 0.5 0.0 - 2.0 %    Neutrophils Absolute 5.36 1.80 - 7.30 E9/L    Immature Granulocytes # 0.05 E9/L    Lymphocytes Absolute 2.43 1.50 - 4.00 E9/L    Monocytes Absolute 0.69 0.10 - 0.95 E9/L    Eosinophils Absolute 0.17 0.05 - 0.50 E9/L    Basophils Absolute 0.04 0.00 - 0.20 E9/L   Lipase   Result Value Ref Range    Lipase 12 (L) 13 - 60 U/L   Urinalysis with Microscopic   Result Value Ref Range    Color, UA Yellow Straw/Yellow    Clarity, UA Clear Clear    Glucose, Ur Negative Negative mg/dL    Bilirubin Urine Negative Negative    Ketones, Urine Negative Negative mg/dL    Specific Gravity, UA 1.020 1.005 - 1.030    Blood, Urine Negative Negative    pH, UA 5.5 5.0 - 9.0    Protein, UA Negative Negative mg/dL    Urobilinogen, Urine 0.2 <2.0 E.U./dL    Nitrite, Urine Negative Negative    Leukocyte Esterase, Urine SMALL (A) Negative    WBC, UA 2-5 0 - 5 /HPF    RBC, UA 0-1 0 - 2 /HPF    Bacteria, UA RARE (A) None Seen /HPF   Lactic Acid, Plasma   Result Value Ref Range    Lactic Acid 0.8 0.5 - 2.2 mmol/L     Imaging: All Radiology results interpreted by Radiologist unless otherwise noted.   CT ABDOMEN PELVIS W IV CONTRAST Additional Contrast? None   Final Result   No acute intra-abdominal process          ED Course / Medical Decision Making     Medications   0.9 % sodium chloride bolus (0 mLs Intravenous Stopped 11/30/20 1909)   fentaNYL (SUBLIMAZE) injection 50 mcg (50 mcg Intravenous Given 11/30/20 1839)   iopamidol (ISOVUE-370) 76 % injection 90 mL (90 mLs Intravenous Given 11/30/20 1933)   sodium chloride flush 0.9 % injection 10 mL (10 mLs Intravenous Given 11/30/20 1933)        Re-Evaluations:  11/30/20      Patients symptoms are improving. Consultations:             None    Procedures:   none    MDM: Patient presents to the ED for evaluation of left lower quadrant abdominal pain. She was given fentanyl with improvement of her symptoms. Laboratory studies were reassuring. Patient had mild transaminitis which was discussed with her but she has absolutely no right upper quadrant pain. Urinalysis was negative for UTI or blood. CT abdomen pelvis obtained showed no acute intra-abdominal process. Patient advised that possibly, this could be musculoskeletal pain. If symptoms do not improve, she was advised to follow-up with her surgeon. She does have a scheduled help appoint with her family doctor tomorrow. She states that she already takes Tylenol with codeine and is any pain management contract, therefore no additional pain prescriptions were written. Patient is comfortable with discharge at this time, strict return precautions discussed. Counseling:   I reviewed today's visit with the patient in addition to providing specific details for the plan of care and counseling regarding the diagnosis and prognosis. Questions are answered at this time and are agreeable with the plan. Assessment      1. Abdominal pain, left lower quadrant    2. Transaminitis      This patient's ED course included: a personal history and physicial examination  This patient has remained hemodynamically stable during their ED course. Plan   Discharge to home. Patient condition is stable. New Medications     Discharge Medication List as of 11/30/2020  8:28 PM        Electronically signed by Davida Gamble DO   DD: 11/30/20  **This report was transcribed using voice recognition software.  Every effort was made to ensure accuracy; however, inadvertent computerized transcription errors may be present.   END OF PROVIDER NOTE        Maryellen Ramon DO  11/30/20 2125

## 2020-12-02 ENCOUNTER — HOSPITAL ENCOUNTER (OUTPATIENT)
Dept: PHYSICAL THERAPY | Age: 55
Setting detail: THERAPIES SERIES
Discharge: HOME | End: 2020-12-02
Payer: COMMERCIAL

## 2020-12-02 NOTE — PROGRESS NOTES
[address] [facility]                Phone: [phone]  Fax: [phone]    Physical Therapy Daily Treatment Note  Date:  2020    Patient Name:  Meir Lozano    :  1965  MRN: [id number]    Restrictions/Precautions:    Diagnosis:  Spinal stenosis of lumbar region  Treatment Diagnosis:    Insurance/Certification information:  [facility]  Referring Physician:  Cruz Domingo Do  Plan of care signed (Y/N):    Visit# / total visits:    Pain level: 10/10   LBP and L knee ( non radiating )   Time In:  1420  Time Out:  1431    Subjective:  Pt continues to report LBP continue to hinder pt's functional mobility ,  endurance , and interrupts her sleep. Pt was in the ER on 2020, for a new issue: pain lower L abdomin that causes \" shooting pain 10/10 \" across her abdomin to the R lower abdominal area. She reported she is still awaiting testing and US to diagnose this new issue. She declined to perform her therapy for her LB. She does not feel she has had any improvement in her symptoms since therapy began. Exercises:  Exercise/Equipment Resistance/Repetitions Other comments                                                                                                         Other :  Functional mobility is guarded d/t pain. Pt has had no reduction in pain since initial eval.     Home Exercise Program:  Sciatic nerve glides, piriformis stretch , lower trunk rotation. 2020 : pt was provided w/ and reviewed inclusive HEP except for recumbent stepper.  Pt reported understanding HEP     Manual Treatments:  N/A    Modalities:      Timed Code Treatment Minutes: 0    Total Treatment Minutes: 11    Treatment/Activity Tolerance:  [] Patient tolerated treatment well [] Patient limited by fatigue  [x] Patient limited by pain  [x] Patient limited by other medical complications  [] Other:       Prognosis: [] Good [x] Fair  [] Poor    Patient Requires Follow-up: [x] Yes  [] No    Plan:   [] Continue per plan of care [] Alter current plan (see comments)  [] Plan of care initiated [] Hold pending MD visit [x] Discharge    Plan for Next Session:         Electronically signed by:  Criss Ferrer, LAZARA [id number]

## 2020-12-16 NOTE — DISCHARGE SUMMARY
[address] [facility]                 Phone: [phone]  Fax: [phone]    Physical Therapy  Out Patient Discharge Summary     Date:  2020    Patient Name:  Jenna Chan    :  1965  MRN: [id number]    DIAGNOSIS:  Spinal stenosis of lumbar region  REFERRING PHYSICIAN:  Mustapha Jonas [address]     ATTENDANCE:  Pt has attended 14 of 14 scheduled treatments from 10/16/20 to 20. TREATMENTS RECEIVED:  Stretching, core stabilization, E-stim, HP    INITIAL STATUS:  · Pain in low back which radiated down B LEs 9/10  · ROM lumbar flexion grossly 80°  · Tightness noted in B hips (hamstrings, gluts, piriformis)  · Core strength 4-/5  · Pt reported poor quality of sleep due to severity of pain    FINAL STATUS:  · Low back pain 10/10  · ROM lumbar flexion unchanged since initial evaluation  · Tightness noted throughout B hips, ROM remains guarded  · Core strength 4- to 4/5  · Pt continues to have difficulty with sleep due to pain    GOALS:  0 out of 5 Long Term Goals were obtained. LONG TERM GOALS NOT OBTAINED/REASON:  Pt condition did not respond to therapeutic interventions. PATIENT GOALS:  To decrease pain    REASON FOR DISCHARGE:  Pt has received maximum benefit from physical therapy    PATIENT EDUCATION/INSTRUCTIONS:  Pt educated in stretches, nerve glides, and core stabilization activities for HEP. RECOMMENDATIONS:  Discharge from physical therapy with recommendation to follow-up with physician due to continued severity of pain. Thank you for the opportunity to work with your patient. If you have questions or comments, please feel free to contact me by phone or fax.       Electronically Signed by: Deanna Rebolledo, PT [id number]  2020

## 2022-05-28 ENCOUNTER — HOSPITAL ENCOUNTER (EMERGENCY)
Age: 57
Discharge: HOME | End: 2022-05-28
Payer: COMMERCIAL

## 2022-05-28 ENCOUNTER — APPOINTMENT (OUTPATIENT)
Dept: GENERAL RADIOLOGY | Age: 57
End: 2022-05-28
Payer: COMMERCIAL

## 2022-05-28 VITALS
HEIGHT: 61 IN | HEART RATE: 62 BPM | RESPIRATION RATE: 16 BRPM | OXYGEN SATURATION: 97 % | BODY MASS INDEX: 34.74 KG/M2 | TEMPERATURE: 97.1 F | DIASTOLIC BLOOD PRESSURE: 78 MMHG | WEIGHT: 184 LBS | SYSTOLIC BLOOD PRESSURE: 134 MMHG

## 2022-05-28 DIAGNOSIS — S80.812A ABRASION OF LEFT LOWER EXTREMITY, INITIAL ENCOUNTER: ICD-10-CM

## 2022-05-28 DIAGNOSIS — M25.422 ELBOW EFFUSION, LEFT: Primary | ICD-10-CM

## 2022-05-28 DIAGNOSIS — S20.212A CONTUSION OF RIB ON LEFT SIDE, INITIAL ENCOUNTER: ICD-10-CM

## 2022-05-28 PROCEDURE — 73110 X-RAY EXAM OF WRIST: CPT

## 2022-05-28 PROCEDURE — 99283 EMERGENCY DEPT VISIT LOW MDM: CPT

## 2022-05-28 PROCEDURE — 29105 APPLICATION LONG ARM SPLINT: CPT

## 2022-05-28 PROCEDURE — 73502 X-RAY EXAM HIP UNI 2-3 VIEWS: CPT

## 2022-05-28 PROCEDURE — 71101 X-RAY EXAM UNILAT RIBS/CHEST: CPT

## 2022-05-28 PROCEDURE — 73560 X-RAY EXAM OF KNEE 1 OR 2: CPT

## 2022-05-28 PROCEDURE — 73610 X-RAY EXAM OF ANKLE: CPT

## 2022-05-28 PROCEDURE — 73070 X-RAY EXAM OF ELBOW: CPT

## 2022-05-28 PROCEDURE — 6370000000 HC RX 637 (ALT 250 FOR IP): Performed by: PHYSICIAN ASSISTANT

## 2022-05-28 PROCEDURE — 73030 X-RAY EXAM OF SHOULDER: CPT

## 2022-05-28 RX ORDER — HYDROCODONE BITARTRATE AND ACETAMINOPHEN 5; 325 MG/1; MG/1
1 TABLET ORAL ONCE
Status: COMPLETED | OUTPATIENT
Start: 2022-05-28 | End: 2022-05-28

## 2022-05-28 RX ADMIN — HYDROCODONE BITARTRATE AND ACETAMINOPHEN 1 TABLET: 5; 325 TABLET ORAL at 13:08

## 2022-05-28 ASSESSMENT — PAIN - FUNCTIONAL ASSESSMENT
PAIN_FUNCTIONAL_ASSESSMENT: ACTIVITIES ARE NOT PREVENTED
PAIN_FUNCTIONAL_ASSESSMENT: 0-10
PAIN_FUNCTIONAL_ASSESSMENT: 0-10

## 2022-05-28 ASSESSMENT — PAIN SCALES - GENERAL
PAINLEVEL_OUTOF10: 10
PAINLEVEL_OUTOF10: 7

## 2022-05-28 ASSESSMENT — PAIN DESCRIPTION - DESCRIPTORS: DESCRIPTORS: ACHING;BURNING;DISCOMFORT

## 2022-05-28 ASSESSMENT — PAIN DESCRIPTION - ORIENTATION: ORIENTATION: LEFT

## 2022-05-28 ASSESSMENT — PAIN DESCRIPTION - LOCATION: LOCATION: ARM;LEG;HIP

## 2022-05-28 NOTE — ED NOTES
Discharge discussed w/ Patient. Pain management and follow up reviewed at this time with no questions.        Jesenia Robert, FABIÁN  05/28/22 [id number]

## 2022-05-28 NOTE — Clinical Note
Abdulkadir Gee was seen and treated in our emergency department on 5/28/2022. She may return to work on 06/04/2022. If you have any questions or concerns, please don't hesitate to call.       VENANCIO Carroll

## 2022-05-31 ENCOUNTER — TELEPHONE (OUTPATIENT)
Dept: ORTHOPEDIC SURGERY | Age: 57
End: 2022-05-31

## 2022-05-31 NOTE — TELEPHONE ENCOUNTER
ED 5/28/22 TTS on call  ED for injuries sustained from falling down approximately 5 steps, beginning this morning. The complaint has been persistent, moderate in severity, and worsened by movement of left arm and left leg as well as deep inspiration. RADIOLOGY:  Interpreted by Radiologist.  XR SHOULDER LEFT (MIN 2 VIEWS)   Final Result   No acute bony abnormality.           XR RIBS LEFT INCLUDE CHEST (MIN 3 VIEWS)   Final Result   No acute cardiopulmonary disease. No definite left-sided rib fracture   present.           XR KNEE LEFT (1-2 VIEWS)   Final Result   No acute bony abnormality.           XR HIP 2-3 VW W PELVIS LEFT   Final Result   No acute bony abnormality.           XR ELBOW LEFT (2 VIEWS)   Final Result   Evidence of a joint effusion raising the concern for an occult fracture. No   fracture line visualized with degenerative changes seen within the elbow. Consider repeat imaging in 7 tendon days if clinical symptoms persist.           XR ANKLE LEFT (MIN 3 VIEWS)   Final Result   No acute bony abnormality.           XR WRIST LEFT (MIN 3 VIEWS)   Final Result   No acute bony abnormality. Routing to providers for scheduling rec.

## 2022-05-31 NOTE — TELEPHONE ENCOUNTER
Patient was seen at [facility] on 5/28 for Elbow effusion, left; Contusion of rib on left side, initial encounter; Abrasion of left lower extremity, initial encounter  was advised to follow up with Dr Nuno Spann. Please contact the patient to schedule.

## 2022-06-02 NOTE — TELEPHONE ENCOUNTER
Call back from  pt scheduled pt. Future Appointments   Date Time Provider Blas Devine   6/8/2022  9:45 AM May Stewart MD [facility]   7/26/2022 10:00 AM Mick Peters MD [facility]     Pt indicated needs RTW letter to excuse her till appt. RTW needs faxed to [employer] on [facility]# [phone]. Fax to #[phone] c/o [address] [place] [address] Dept.

## 2022-06-08 ENCOUNTER — OFFICE VISIT (OUTPATIENT)
Dept: ORTHOPEDIC SURGERY | Age: 57
End: 2022-06-08
Payer: COMMERCIAL

## 2022-06-08 ENCOUNTER — HOSPITAL ENCOUNTER (OUTPATIENT)
Dept: GENERAL RADIOLOGY | Age: 57
Discharge: HOME | End: 2022-06-10
Payer: COMMERCIAL

## 2022-06-08 VITALS — HEIGHT: 61 IN | BODY MASS INDEX: 34.74 KG/M2 | WEIGHT: 184 LBS

## 2022-06-08 DIAGNOSIS — S59.902A INJURY OF LEFT ELBOW, INITIAL ENCOUNTER: Primary | ICD-10-CM

## 2022-06-08 DIAGNOSIS — S52.125A NONDISPLACED FRACTURE OF HEAD OF LEFT RADIUS, INITIAL ENCOUNTER FOR CLOSED FRACTURE: ICD-10-CM

## 2022-06-08 DIAGNOSIS — S59.902A INJURY OF LEFT ELBOW, INITIAL ENCOUNTER: ICD-10-CM

## 2022-06-08 PROCEDURE — 99202 OFFICE O/P NEW SF 15 MIN: CPT

## 2022-06-08 PROCEDURE — 3017F COLORECTAL CA SCREEN DOC REV: CPT | Performed by: PHYSICIAN ASSISTANT

## 2022-06-08 PROCEDURE — 99203 OFFICE O/P NEW LOW 30 MIN: CPT | Performed by: PHYSICIAN ASSISTANT

## 2022-06-08 PROCEDURE — 1036F TOBACCO NON-USER: CPT | Performed by: PHYSICIAN ASSISTANT

## 2022-06-08 PROCEDURE — G8427 DOCREV CUR MEDS BY ELIG CLIN: HCPCS | Performed by: PHYSICIAN ASSISTANT

## 2022-06-08 PROCEDURE — 99202 OFFICE O/P NEW SF 15 MIN: CPT | Performed by: ORTHOPAEDIC SURGERY

## 2022-06-08 PROCEDURE — G8417 CALC BMI ABV UP PARAM F/U: HCPCS | Performed by: PHYSICIAN ASSISTANT

## 2022-06-08 PROCEDURE — 73080 X-RAY EXAM OF ELBOW: CPT

## 2022-06-08 NOTE — PATIENT INSTRUCTIONS
Recommend limited usage of left upper extremity with no heavy lifting, pushing or pulling    Patient was given a form to return to work on 6/13/2022. Sling for comfort when out. Okay to remove sling when home to work on gentle range of motion of the elbow, wrist and digits. Follow-up in 4 weeks for reevaluation and x-rays. Call if any questions or concerns. Patient Education        Elbow: Exercises  Introduction  Here are some examples of exercises for you to try. The exercises may be suggested for a condition or for rehabilitation. Start each exercise slowly. Ease off the exercises if you start to have pain. You will be told when to start these exercises and which ones will work bestfor you. How to do the exercises  Wrist flexor stretch    1. Extend your arm in front of you with your palm up. 2. Bend your wrist, pointing your hand toward the floor. 3. With your other hand, gently bend your wrist farther until you feel a mild to moderate stretch in your forearm. 4. Hold for at least 15 to 30 seconds. Repeat 2 to 4 times. Wrist extensor stretch    1. Repeat steps 1 to 4 of the stretch above but begin with your extended hand palm down. Ball or sock squeeze    1. Hold a tennis ball (or a rolled-up sock) in your hand. 2. Make a fist around the ball (or sock) and squeeze. 3. Hold for about 6 seconds, and then relax for up to 10 seconds. 4. Repeat 8 to 12 times. 5. Switch the ball (or sock) to your other hand and do 8 to 12 times. Wrist deviation    1. Sit so that your arm is supported but your hand hangs off the edge of a flat surface, such as a table. 2. Hold your hand out like you are shaking hands with someone. 3. Move your hand up and down. 4. Repeat this motion 8 to 12 times. 5. Switch arms. 6. Try to do this exercise twice with each hand. Wrist curls    1. Place your forearm on a table with your hand hanging over the edge of the table, palm up.   2. Place a 1- to 2-pound weight in your hand. This may be a dumbbell, a can of food, or a filled water bottle. 3. Slowly raise and lower the weight while keeping your forearm on the table and your palm facing up. 4. Repeat this motion 8 to 12 times. 5. Switch arms, and do steps 1 through 4.  6. Repeat with your hand facing down toward the floor. Switch arms. Biceps curls    1. Sit leaning forward with your legs slightly spread and your left hand on your left thigh. 2. Place your right elbow on your right thigh, and hold the weight with your forearm horizontal.  3. Slowly curl the weight up and toward your chest.  4. Repeat this motion 8 to 12 times. 5. Switch arms, and do steps 1 through 4. Follow-up care is a key part of your treatment and safety. Be sure to make and go to all appointments, and call your doctor if you are having problems. It's also a good idea to know your test results and keep alist of the medicines you take. Where can you learn more? Go to https://[employer]. org and sign in to your [employer] account. Enter M279 in the [employer] box to learn more about \"Elbow: Exercises. \"     If you do not have an account, please click on the \"Sign Up Now\" link. Current as of: July 1, 2021               Content Version: 13.2  © 9928-6075 Healthwise, Incorporated. Care instructions adapted under license by [facility] ([facility]). If you have questions about a medical condition or this instruction, always ask your healthcare professional. [place] [address] any warranty or liability for your use of this information.

## 2022-06-08 NOTE — LETTER
[address]  [address]  [address] [facility] [address]  Phone: [phone]  Fax: [phone]      June 8, 2022     Patient: Xander Jefferson   YOB: 1965   Date of Visit: 6/8/2022       To Whom It May Concern: It is my medical opinion that Nohemi Cloud may return to work on 6-13-22 without restrictions. .    If you have any questions or concerns, please don't hesitate to call.     Sincerely,        VENANCIO Macdonald

## 2022-06-08 NOTE — PROGRESS NOTES
epicondyle. She was placed into a splint and sling and sent here for follow-up. She states that the pain and swelling has gone down in her arm. She works at [employer] and states that she feels she would be ready to return to work next week. No other orthopedic complaints at this time. Review of Systems   Constitutional: Negative for fever, chills, diaphoresis, appetite change and fatigue. HENT: Negative for dental issues, hearing loss and tinnitus. Negative for congestion, sinus pressure, sneezing, sore throat. Negative for headache. Eyes: Negative for visual disturbance, blurred and double vision. Negative for pain, discharge, redness and itching  Respiratory: Negative for cough, shortness of breath and wheezing. Cardiovascular: Negative for chest pain, palpitations and leg swelling. No dyspnea on exertion   Gastrointestinal:   Negative for nausea, vomiting, abdominal pain, diarrhea, constipation  or black or bloody. Hematologic\Lymphatic:  negative for bleeding, petechiae,   Genitourinary: Negative for hematuria and difficulty urinating. Musculoskeletal: Negative for neck pain and stiffness. Negative for back pain, see HPI  Skin: Negative for pallor, rash and wound. Neurological: Negative for dizziness, tremors, seizures, weakness, light-headedness, no TIA or stroke symptoms. No numbness and headaches. Psychiatric/Behavioral: Negative. OBJECTIVE:      Physical Examination:   General appearance: alert, well appearing, and in no distress,  normal appearing weight.  No visible signs of trauma   Mental status: alert, oriented to person, place, and time, normal mood, behavior, speech, dress, motor activity, and thought processes  Abdomen: soft, nondistended  Resp:   resp easy and unlabored, no audible wheezes note, normal symmetrical expansion of both hemithoraces  Cardiac: distal pulses palpable, skin and extremities well perfused  Neurological: alert, oriented X3, normal speech, no focal findings or movement disorder noted, motor and sensory grossly normal bilaterally, normal muscle tone, no tremors, strength 5/5, normal gait and station  HEENT: normochephalic atraumatic, external ears and eyes normal, sclera normal, neck supple, no nasal discharge. Extremities:   peripheral pulses normal, no edema, redness or tenderness in the calves   Skin: normal coloration, no rashes or open wounds, no suspicious skin lesions noted  Psych: Affect euthymic   Musculoskeletal:   Extremity:  Left Upper Extremity  Skin is clean dry and intact  Mild edema noted  Radial pulse palpable, fingers warm with BCR  Flex/extension intact to wrist, thumb and fingers  Finger opposition intact  Finger adduction/abduction intact  Finger crossover intact  Subjectively states sensation intact to radial/medial/ulnar distribution  Moderate tenderness over the radial head  Elbow arc of motion 3135 without pain  No block with supination/pronation  Good motion of the wrist and digits    Ht 5' 1\" (1.549 m)   Wt 184 lb (83.5 kg)   LMP 07/27/2013   BMI 34.77 kg/m²      XR: 6/8/22   2 views left elbow:  FINDINGS:   Right elbow joint effusion appears diminished.  No direct visualization of an   underlying fracture.  An occult fracture remains likely.           Impression   Diminishing right elbow joint effusion.  No direct visualization of an   underlying fracture.  See above.             ASSESSMENT:   Diagnosis Orders   1. Injury of left elbow, initial encounter  XR ELBOW LEFT (MIN 3 VIEWS)   2. Nondisplaced fracture of head of left radius, initial encounter for closed fracture       Discussion: Had lengthy discussion with patient regarding Her diagnosis, typical prognosis, and expected outcomes. I reviewed the possible complications from the injury itself despite treatment choosen. I also discussed treatment options including nonoperative managements versus surgical management, along with risks and benefits of each.  They have elected for nonoperative management at this time. Discussed with the patient that x-rays did not show a definitive fracture although there is a fat pad sign and she is moderately tender over the radial head indicative of a likely nondisplaced fracture. PLAN:  X-rays reviewed and discussed. Recommend limited usage of left upper extremity with no heavy lifting, pushing or pulling    Patient was given a form to return to work on 6/13/2022. Sling for comfort when out. Okay to remove sling when home to work on gentle range of motion of the elbow, wrist and digits. Follow-up in 4 weeks for reevaluation and x-rays. Call if any questions or concerns. Patient Education     Elbow: Exercises  Introduction  Here are some examples of exercises for you to try. The exercises may be suggested for a condition or for rehabilitation. Start each exercise slowly. Ease off the exercises if you start to have pain. You will be told when to start these exercises and which ones will work bestfor you. How to do the exercises  Wrist flexor stretch    1. Extend your arm in front of you with your palm up. 2. Bend your wrist, pointing your hand toward the floor. 3. With your other hand, gently bend your wrist farther until you feel a mild to moderate stretch in your forearm. 4. Hold for at least 15 to 30 seconds. Repeat 2 to 4 times. Wrist extensor stretch    1. Repeat steps 1 to 4 of the stretch above but begin with your extended hand palm down. Ball or sock squeeze    1. Hold a tennis ball (or a rolled-up sock) in your hand. 2. Make a fist around the ball (or sock) and squeeze. 3. Hold for about 6 seconds, and then relax for up to 10 seconds. 4. Repeat 8 to 12 times. 5. Switch the ball (or sock) to your other hand and do 8 to 12 times. Wrist deviation    1. Sit so that your arm is supported but your hand hangs off the edge of a flat surface, such as a table.   2. Hold your hand out like you are shaking hands with someone. 3. Move your hand up and down. 4. Repeat this motion 8 to 12 times. 5. Switch arms. 6. Try to do this exercise twice with each hand. Wrist curls    1. Place your forearm on a table with your hand hanging over the edge of the table, palm up. 2. Place a 1- to 2-pound weight in your hand. This may be a dumbbell, a can of food, or a filled water bottle. 3. Slowly raise and lower the weight while keeping your forearm on the table and your palm facing up. 4. Repeat this motion 8 to 12 times. 5. Switch arms, and do steps 1 through 4.  6. Repeat with your hand facing down toward the floor. Switch arms. Biceps curls    1. Sit leaning forward with your legs slightly spread and your left hand on your left thigh. 2. Place your right elbow on your right thigh, and hold the weight with your forearm horizontal.  3. Slowly curl the weight up and toward your chest.  4. Repeat this motion 8 to 12 times. 5. Switch arms, and do steps 1 through 4. Follow-up care is a key part of your treatment and safety. Be sure to make and go to all appointments, and call your doctor if you are having problems. It's also a good idea to know your test results and keep alist of the medicines you take. Where can you learn more? Go to https://Flypost.[employer].Sharelook. org and sign in to your [employer] account. Enter M279 in the [facility] box to learn more about \"Elbow: Exercises. \"     If you do not have an account, please click on the \"Sign Up Now\" link. Current as of: July 1, 2021               Content Version: 13.2  © 3082-1793 Healthwise, [employer]. Care instructions adapted under license by [facility] ([facility]). If you have questions about a medical condition or this instruction, always ask your healthcare professional. [place] [address] any warranty or liability for your use of this information.     Electronically signed by VENANCIO Duran on 6/8/2022 at 10:39 AM  Note: This report was completed using computerize voiced recognition software. Every effort has been made to ensure accuracy; however, inadvertent computerized transcription errors may be present.

## 2022-06-08 NOTE — PROGRESS NOTES
Patient here for an ED follow up from 05/28/2022 for left elbow occult fx. Patient states that a month ago she fell getting out of her car, denied any injury at this time. Patient states that on 05/28 she fell down 5 steps that morning. Patient claims that she did hit her head when she fell, but did not LOC. Patient denies any previous injury or surgery to the left elbow.        Electronically signed by Romero Marion MA on 6/8/2022 at 9:42 AM

## 2022-07-06 DIAGNOSIS — S52.125A NONDISPLACED FRACTURE OF HEAD OF LEFT RADIUS, INITIAL ENCOUNTER FOR CLOSED FRACTURE: Primary | ICD-10-CM

## 2022-08-21 ENCOUNTER — HOSPITAL ENCOUNTER (EMERGENCY)
Age: 57
Discharge: HOME | End: 2022-08-21
Attending: EMERGENCY MEDICINE
Payer: COMMERCIAL

## 2022-08-21 VITALS
SYSTOLIC BLOOD PRESSURE: 128 MMHG | OXYGEN SATURATION: 95 % | RESPIRATION RATE: 17 BRPM | DIASTOLIC BLOOD PRESSURE: 88 MMHG | HEART RATE: 82 BPM | TEMPERATURE: 97 F

## 2022-08-21 DIAGNOSIS — H65.02 NON-RECURRENT ACUTE SEROUS OTITIS MEDIA OF LEFT EAR: Primary | ICD-10-CM

## 2022-08-21 PROCEDURE — 99283 EMERGENCY DEPT VISIT LOW MDM: CPT

## 2022-08-21 RX ORDER — AMOXICILLIN AND CLAVULANATE POTASSIUM 875; 125 MG/1; MG/1
1 TABLET, FILM COATED ORAL 2 TIMES DAILY
Qty: 14 TABLET | Refills: 0 | Status: SHIPPED | OUTPATIENT
Start: 2022-08-21 | End: 2022-08-28

## 2022-08-21 ASSESSMENT — ENCOUNTER SYMPTOMS
BACK PAIN: 0
FACIAL SWELLING: 0
SHORTNESS OF BREATH: 0
NAUSEA: 0
SORE THROAT: 0
EYE PAIN: 0
ABDOMINAL PAIN: 0
VOMITING: 0
DIARRHEA: 0

## 2022-08-21 ASSESSMENT — PAIN DESCRIPTION - FREQUENCY: FREQUENCY: CONTINUOUS

## 2022-08-21 ASSESSMENT — PAIN SCALES - GENERAL
PAINLEVEL_OUTOF10: 9
PAINLEVEL_OUTOF10: 6

## 2022-08-21 ASSESSMENT — PAIN - FUNCTIONAL ASSESSMENT
PAIN_FUNCTIONAL_ASSESSMENT: 0-10
PAIN_FUNCTIONAL_ASSESSMENT: 0-10
PAIN_FUNCTIONAL_ASSESSMENT: ACTIVITIES ARE NOT PREVENTED

## 2022-08-21 ASSESSMENT — PAIN DESCRIPTION - ORIENTATION: ORIENTATION: LEFT

## 2022-08-21 ASSESSMENT — PAIN DESCRIPTION - PAIN TYPE: TYPE: ACUTE PAIN

## 2022-08-21 ASSESSMENT — PAIN DESCRIPTION - ONSET: ONSET: ON-GOING

## 2022-08-21 ASSESSMENT — PAIN DESCRIPTION - LOCATION
LOCATION: EAR
LOCATION: EAR

## 2022-08-21 ASSESSMENT — PAIN DESCRIPTION - DESCRIPTORS: DESCRIPTORS: ACHING

## 2022-08-21 NOTE — Clinical Note
Bee Jarrett was seen and treated in our emergency department on 8/21/2022. She may return to work on 08/22/2022. If you have any questions or concerns, please don't hesitate to call.       Joe Fonseca, DO

## 2022-08-21 NOTE — ED PROVIDER NOTES
[address] ENCOUNTER      Pt Name: Ag Killian  MRN: [id number]  [place] 1965  Date of evaluation: 8/21/2022      CHIEF COMPLAINT       Chief Complaint   Patient presents with    Otalgia     Left ear pain, cannot see [facility] doctor until September. Has meds but not helping her pain        HPI  Ag Killian is a 61 y.o. female with history of chronic back pain presenting to the emergency department with complaint of otalgia that has been ongoing for the past week and a half. She states she saw her primary care provider who prescribed her oral steroids and steroid eardrops and referred her to ENT. Of note she recently saw her dentist for a tooth extraction and was placed on a short course of amoxicillin which she has not yet finished. A week ago she noticed a small amount of blood in both ears. Currently there is no discharge or bleeding. She currently has 9/10 in severity pain in her left ear with no radiation. She describes a pressure sensation with no relieving factors. She takes Norco's and gabapentin for low back pain, however this not helping her pain. She reports associated nasal congestion. She denies any fevers, chills, nausea, vomiting, chest pain, shortness of breath, or rash. Except as noted above the remainder of the review of systems was reviewed and negative. Review of Systems   Constitutional:  Negative for chills and fever. HENT:  Positive for congestion, dental problem and ear pain. Negative for ear discharge, facial swelling and sore throat. Eyes:  Negative for pain. Respiratory:  Negative for shortness of breath. Cardiovascular:  Negative for chest pain. Gastrointestinal:  Negative for abdominal pain, diarrhea, nausea and vomiting. Genitourinary:  Negative for flank pain. Musculoskeletal:  Negative for back pain and neck pain. Skin:  Negative for rash.    Neurological:  Negative for headaches. Psychiatric/Behavioral:  Negative for behavioral problems. The patient is not nervous/anxious. Physical Exam  Constitutional:       General: She is not in acute distress. Appearance: Normal appearance. She is not ill-appearing. HENT:      Head: Normocephalic and atraumatic. Right Ear: Tympanic membrane and external ear normal.      Left Ear: External ear normal.      Ears:      Comments: Right-sided dried blood in the ear canal with no obvious laceration or damage to the TM. Left ear with impacted cerumen. Nose: Nose normal.      Mouth/Throat:      Mouth: Mucous membranes are moist.      Pharynx: No oropharyngeal exudate. Comments: Poor dentition with multiple pulled teeth, no signs of abscess  Eyes:      Extraocular Movements: Extraocular movements intact. Conjunctiva/sclera: Conjunctivae normal.      Pupils: Pupils are equal, round, and reactive to light. Cardiovascular:      Rate and Rhythm: Normal rate. Pulses: Normal pulses. Pulmonary:      Effort: No respiratory distress. Breath sounds: Normal breath sounds. Abdominal:      General: Abdomen is flat. Palpations: Abdomen is soft. Tenderness: There is no abdominal tenderness. There is no guarding or rebound. Musculoskeletal:         General: No deformity or signs of injury. Cervical back: Neck supple. No rigidity. Skin:     General: Skin is warm and dry. Neurological:      Mental Status: She is alert and oriented to person, place, and time. Mental status is at baseline. Psychiatric:         Mood and Affect: Mood normal.        Procedures     MDM  Patient is a 26-year-old female presenting with left-sided otalgia currently on amoxicillin for dental procedure. On exam she is noted to have cerumen impaction in the left ear. The ear wax was removed. Repeat evaluation showed erythema and a cloudy TM concerning for otitis media.   Patient was advised to discontinue her amoxicillin and was started on Augmentin. She reported significant improvement in her pain after earwax removal.      Patient is awake alert, hemodynamic stable, afebrile and in no respiratory distress. Discussed with patient plan for close outpatient follow-up with the patient's PCP as well as return precautions and the patient understands and agrees to the plan. ED Course as of 08/21/22 1130   Sun Aug 21, 2022   1123 Patient was reevaluated after earwax removal.  Tympanic membrane was noted to have erythema and clouding concerning for otitis media. [TO]      ED Course User Index  [TO] Joe Fonseca DO           ED Course as of 08/21/22 1130   Sun Aug 21, 2022   1123 Patient was reevaluated after earwax removal.  Tympanic membrane was noted to have erythema and clouding concerning for otitis media. [TO]      ED Course User Index  [TO] Joe Fonseca DO       --------------------------------------------- PAST HISTORY ---------------------------------------------  Past Medical History:  has a past medical history of Coccyx pain, Fall, GERD (gastroesophageal reflux disease), Hyperparathyroidism ([address] Hypertension, IBS (irritable bowel syndrome), and OA (osteoarthritis of spine). Past Surgical History:  has a past surgical history that includes Bunionectomy (Bilateral); Colonoscopy ([id number]); Foot surgery (Bilateral); Tubal ligation; Upper gastrointestinal endoscopy (06/15/2017); Cholecystectomy; and Colonoscopy (10/16/2017). Social History:  reports that she has never smoked. She has never used smokeless tobacco. She reports current alcohol use. She reports that she does not use drugs. Family History: family history includes Colon Cancer in her father; High Blood Pressure in her father and mother; Liver Cancer in her father; Other in her father and mother; Prostate Cancer in her father. The patients home medications have been reviewed.     Allergies: Anaprox [naproxen], Motrin [ibuprofen], and Nsaids    -------------------------------------------------- RESULTS -------------------------------------------------  Labs:  No results found for this visit on 08/21/22. Radiology:  No orders to display       ------------------------- NURSING NOTES AND VITALS REVIEWED ---------------------------  Date / Time Roomed:  8/21/2022 10:13 AM  ED Bed Assignment:  10/10    The nursing notes within the ED encounter and vital signs as below have been reviewed. BP (!) 133/92   Pulse 79   Temp 97 °F (36.1 °C)   Resp 16   LMP 07/27/2013   SpO2 92%   Oxygen Saturation Interpretation: normal      ------------------------------------------ PROGRESS NOTES ------------------------------------------    I have spoken with the patient and discussed todays results, in addition to providing specific details for the plan of care and counseling regarding the diagnosis and prognosis. Their questions are answered at this time and they are agreeable with the plan. I discussed at length with them reasons for immediate return here for re evaluation. They will followup with their PCP by calling their office tomorrow. --------------------------------- ADDITIONAL PROVIDER NOTES ---------------------------------  At this time the patient is without objective evidence of an acute process requiring hospitalization or inpatient management. They have remained hemodynamically stable throughout their entire ED visit and are stable for discharge with outpatient follow-up. The plan has been discussed in detail and they are aware of the specific conditions for emergent return, as well as the importance of follow-up. New Prescriptions    AMOXICILLIN-CLAVULANATE (AUGMENTIN) 875-125 MG PER TABLET    Take 1 tablet by mouth 2 times daily for 7 days       Diagnosis:  1. Non-recurrent acute serous otitis media of left ear        Disposition:  Patient's disposition: Discharge to home  Patient's condition is stable.         Ayan Conklin, DO  Resident  08/21/22 1133

## 2022-09-02 ENCOUNTER — HOSPITAL ENCOUNTER (EMERGENCY)
Age: 57
Discharge: HOME | End: 2022-09-03
Attending: EMERGENCY MEDICINE
Payer: COMMERCIAL

## 2022-09-02 DIAGNOSIS — R03.0 ELEVATED BLOOD PRESSURE READING: ICD-10-CM

## 2022-09-02 DIAGNOSIS — R19.7 DIARRHEA, UNSPECIFIED TYPE: Primary | ICD-10-CM

## 2022-09-02 LAB
ALBUMIN SERPL-MCNC: 3.9 G/DL (ref 3.5–5.2)
ALP BLD-CCNC: 67 U/L (ref 35–104)
ALT SERPL-CCNC: 11 U/L (ref 0–32)
ANION GAP SERPL CALCULATED.3IONS-SCNC: 12 MMOL/L (ref 7–16)
AST SERPL-CCNC: 17 U/L (ref 0–31)
BACTERIA: ABNORMAL /HPF
BASOPHILS ABSOLUTE: 0.04 E9/L (ref 0–0.2)
BASOPHILS RELATIVE PERCENT: 0.6 % (ref 0–2)
BILIRUB SERPL-MCNC: 0.4 MG/DL (ref 0–1.2)
BILIRUBIN URINE: NEGATIVE
BLOOD, URINE: NEGATIVE
BUN BLDV-MCNC: 8 MG/DL (ref 6–20)
CALCIUM SERPL-MCNC: 9.8 MG/DL (ref 8.6–10.2)
CHLORIDE BLD-SCNC: 106 MMOL/L (ref 98–107)
CLARITY: CLEAR
CO2: 25 MMOL/L (ref 22–29)
COLOR: YELLOW
CREAT SERPL-MCNC: 0.6 MG/DL (ref 0.5–1)
EOSINOPHILS ABSOLUTE: 0.05 E9/L (ref 0.05–0.5)
EOSINOPHILS RELATIVE PERCENT: 0.7 % (ref 0–6)
GFR AFRICAN AMERICAN: >60
GFR NON-AFRICAN AMERICAN: >60 ML/MIN/1.73
GLUCOSE BLD-MCNC: 90 MG/DL (ref 74–99)
GLUCOSE URINE: NEGATIVE MG/DL
HCT VFR BLD CALC: 39.2 % (ref 34–48)
HEMOGLOBIN: 13.1 G/DL (ref 11.5–15.5)
IMMATURE GRANULOCYTES #: 0.03 E9/L
IMMATURE GRANULOCYTES %: 0.4 % (ref 0–5)
KETONES, URINE: NEGATIVE MG/DL
LACTIC ACID: 0.6 MMOL/L (ref 0.5–2.2)
LEUKOCYTE ESTERASE, URINE: ABNORMAL
LIPASE: 14 U/L (ref 13–60)
LYMPHOCYTES ABSOLUTE: 2.34 E9/L (ref 1.5–4)
LYMPHOCYTES RELATIVE PERCENT: 33.8 % (ref 20–42)
MCH RBC QN AUTO: 29.6 PG (ref 26–35)
MCHC RBC AUTO-ENTMCNC: 33.4 % (ref 32–34.5)
MCV RBC AUTO: 88.5 FL (ref 80–99.9)
MONOCYTES ABSOLUTE: 0.58 E9/L (ref 0.1–0.95)
MONOCYTES RELATIVE PERCENT: 8.4 % (ref 2–12)
NEUTROPHILS ABSOLUTE: 3.89 E9/L (ref 1.8–7.3)
NEUTROPHILS RELATIVE PERCENT: 56.1 % (ref 43–80)
NITRITE, URINE: NEGATIVE
PDW BLD-RTO: 12.5 FL (ref 11.5–15)
PH UA: 6 (ref 5–9)
PLATELET # BLD: 232 E9/L (ref 130–450)
PMV BLD AUTO: 10.4 FL (ref 7–12)
POTASSIUM REFLEX MAGNESIUM: 3.6 MMOL/L (ref 3.5–5)
PROTEIN UA: NEGATIVE MG/DL
RBC # BLD: 4.43 E12/L (ref 3.5–5.5)
RBC UA: ABNORMAL /HPF (ref 0–2)
SODIUM BLD-SCNC: 143 MMOL/L (ref 132–146)
SPECIFIC GRAVITY UA: 1.02 (ref 1–1.03)
TOTAL PROTEIN: 7.4 G/DL (ref 6.4–8.3)
UROBILINOGEN, URINE: 0.2 E.U./DL
WBC # BLD: 6.9 E9/L (ref 4.5–11.5)
WBC UA: ABNORMAL /HPF (ref 0–5)

## 2022-09-02 PROCEDURE — 36415 COLL VENOUS BLD VENIPUNCTURE: CPT

## 2022-09-02 PROCEDURE — 87088 URINE BACTERIA CULTURE: CPT

## 2022-09-02 PROCEDURE — 83605 ASSAY OF LACTIC ACID: CPT

## 2022-09-02 PROCEDURE — 85025 COMPLETE CBC W/AUTO DIFF WBC: CPT

## 2022-09-02 PROCEDURE — 80053 COMPREHEN METABOLIC PANEL: CPT

## 2022-09-02 PROCEDURE — 81001 URINALYSIS AUTO W/SCOPE: CPT

## 2022-09-02 PROCEDURE — 99283 EMERGENCY DEPT VISIT LOW MDM: CPT

## 2022-09-02 PROCEDURE — 83690 ASSAY OF LIPASE: CPT

## 2022-09-02 ASSESSMENT — PAIN - FUNCTIONAL ASSESSMENT: PAIN_FUNCTIONAL_ASSESSMENT: NONE - DENIES PAIN

## 2022-09-02 NOTE — Clinical Note
Zane Martínez was seen and treated in our emergency department on 9/2/2022. She may return to work on 09/06/2022. If you have any questions or concerns, please don't hesitate to call.       Marilu Blanton MD

## 2022-09-03 VITALS
WEIGHT: 187 LBS | HEART RATE: 52 BPM | OXYGEN SATURATION: 100 % | DIASTOLIC BLOOD PRESSURE: 96 MMHG | SYSTOLIC BLOOD PRESSURE: 186 MMHG | RESPIRATION RATE: 18 BRPM | TEMPERATURE: 97.3 F | HEIGHT: 61 IN | BODY MASS INDEX: 35.3 KG/M2

## 2022-09-03 RX ORDER — DICYCLOMINE HYDROCHLORIDE 10 MG/1
20 CAPSULE ORAL
Qty: 15 CAPSULE | Refills: 0 | Status: SHIPPED | OUTPATIENT
Start: 2022-09-03 | End: 2023-09-03

## 2022-09-03 NOTE — ED PROVIDER NOTES
HPI:  9/3/22, Time: 1:10 AM EDT        Brianna Esparza is a 61 y.o. female presenting to the ED for intermittent diarrhea beginning 4-5 days ago. The complaint has been intermittent, moderate in severity, and worsened by nothing. Patient's tried Imodium A-D pills for diarrhea relief but is not been effective. She has not had any fever/chills, no hematemesis, no melena, no hematochezia, no change in bladder habit patterns. No relieving factors reported. No other complaints. Review of Systems:   A complete review of systems was performed and pertinent positives and negatives are stated within HPI, all other systems reviewed and are negative.    --------------------------------------------- PAST HISTORY ---------------------------------------------  Past Medical History:  has a past medical history of Coccyx pain, Fall, GERD (gastroesophageal reflux disease), Hyperparathyroidism ([address] Hypertension, IBS (irritable bowel syndrome), and OA (osteoarthritis of spine). Past Surgical History:  has a past surgical history that includes Bunionectomy (Bilateral); Colonoscopy ([id number]); Foot surgery (Bilateral); Tubal ligation; Upper gastrointestinal endoscopy (06/15/2017); Cholecystectomy; and Colonoscopy (10/16/2017). Social History:  reports that she has never smoked. She has never used smokeless tobacco. She reports current alcohol use. She reports that she does not use drugs. Family History: family history includes Colon Cancer in her father; High Blood Pressure in her father and mother; Liver Cancer in her father; Other in her father and mother; Prostate Cancer in her father. The patients home medications have been reviewed.     Allergies: Anaprox [naproxen], Motrin [ibuprofen], and Nsaids    -------------------------------------------------- RESULTS -------------------------------------------------  All laboratory and radiology results have been personally reviewed by myself   LABS:  Results for orders placed or performed during the hospital encounter of 09/02/22   Culture, Urine    Specimen: Urine, clean catch   Result Value Ref Range    Urine Culture, Routine <10,000 CFU/mL  Gram positive organism      CBC with Auto Differential   Result Value Ref Range    WBC 6.9 4.5 - 11.5 E9/L    RBC 4.43 3.50 - 5.50 E12/L    Hemoglobin 13.1 11.5 - 15.5 g/dL    Hematocrit 39.2 34.0 - 48.0 %    MCV 88.5 80.0 - 99.9 fL    MCH 29.6 26.0 - 35.0 pg    MCHC 33.4 32.0 - 34.5 %    RDW 12.5 11.5 - 15.0 fL    Platelets 049 402 - 892 E9/L    MPV 10.4 7.0 - 12.0 fL    Neutrophils % 56.1 43.0 - 80.0 %    Immature Granulocytes % 0.4 0.0 - 5.0 %    Lymphocytes % 33.8 20.0 - 42.0 %    Monocytes % 8.4 2.0 - 12.0 %    Eosinophils % 0.7 0.0 - 6.0 %    Basophils % 0.6 0.0 - 2.0 %    Neutrophils Absolute 3.89 1.80 - 7.30 E9/L    Immature Granulocytes # 0.03 E9/L    Lymphocytes Absolute 2.34 1.50 - 4.00 E9/L    Monocytes Absolute 0.58 0.10 - 0.95 E9/L    Eosinophils Absolute 0.05 0.05 - 0.50 E9/L    Basophils Absolute 0.04 0.00 - 0.20 E9/L   Comprehensive Metabolic Panel w/ Reflex to MG   Result Value Ref Range    Sodium 143 132 - 146 mmol/L    Potassium reflex Magnesium 3.6 3.5 - 5.0 mmol/L    Chloride 106 98 - 107 mmol/L    CO2 25 22 - 29 mmol/L    Anion Gap 12 7 - 16 mmol/L    Glucose 90 74 - 99 mg/dL    BUN 8 6 - 20 mg/dL    Creatinine 0.6 0.5 - 1.0 mg/dL    GFR Non-African American >60 >=60 mL/min/1.73    GFR African American >60     Calcium 9.8 8.6 - 10.2 mg/dL    Total Protein 7.4 6.4 - 8.3 g/dL    Albumin 3.9 3.5 - 5.2 g/dL    Total Bilirubin 0.4 0.0 - 1.2 mg/dL    Alkaline Phosphatase 67 35 - 104 U/L    ALT 11 0 - 32 U/L    AST 17 0 - 31 U/L   Lactic Acid   Result Value Ref Range    Lactic Acid 0.6 0.5 - 2.2 mmol/L   Lipase   Result Value Ref Range    Lipase 14 13 - 60 U/L   Urinalysis with Microscopic   Result Value Ref Range    Color, UA Yellow Straw/Yellow    Clarity, UA Clear Clear    Glucose, Ur Negative Negative mg/dL Bilirubin Urine Negative Negative    Ketones, Urine Negative Negative mg/dL    Specific Gravity, UA 1.020 1.005 - 1.030    Blood, Urine Negative Negative    pH, UA 6.0 5.0 - 9.0    Protein, UA Negative Negative mg/dL    Urobilinogen, Urine 0.2 <2.0 E.U./dL    Nitrite, Urine Negative Negative    Leukocyte Esterase, Urine TRACE (A) Negative    WBC, UA 2-5 0 - 5 /HPF    RBC, UA NONE 0 - 2 /HPF    Bacteria, UA NONE SEEN None Seen /HPF       RADIOLOGY:  Interpreted by Radiologist.  No orders to display       ------------------------- NURSING NOTES AND VITALS REVIEWED ---------------------------  The nursing notes within the ED encounter and vital signs as below have been reviewed. BP (!) 186/96   Pulse 52   Temp 97.3 °F (36.3 °C)   Resp 18   Ht 5' 1\" (1.549 m)   Wt 187 lb (84.8 kg)   LMP 07/27/2013   SpO2 100%   BMI 35.33 kg/m²   Oxygen Saturation Interpretation: Normal      ---------------------------------------------------PHYSICAL EXAM--------------------------------------      Constitutional/General: Alert and oriented x3, well appearing, non toxic in mild distress   head: Normocephalic and atraumatic  Eyes: PERRL, EOMI, otherwise normal  Mouth: Oropharynx clear, handling secretions, no trismus  Neck: Supple, full ROM, otherwise normal  Pulmonary: Lungs clear to auscultation bilaterally, no wheezes, rales, or rhonchi. Not in respiratory distress  Cardiovascular:  Regular rate and rhythm, no murmurs, gallops, or rubs. 2+ distal pulses  GI: Soft, non tender, non distended, no organomegaly masses no guarding or rigidity normal active bowel sounds; no focal tenderness along the right lower quadrant/McBurney's point  Extremities: Moves all extremities x 4.  Warm and well perfused  Skin: warm and dry without rash  Neurologic: GCS 15, annual nerves II through XII grossly intact with no focal deficits otherwise normal  Psych: Normal Affect      ------------------------------ ED COURSE/MEDICAL DECISION MAKING----------------------  Medications - No data to display      ED COURSE:     Medical Decision Making: Serial examination shows no focal tenderness along the right lower quadrant/McBurney's point nor any findings to suggest acute appendicitis. Screening labs are unremarkable. Patient's blood pressure is moderately elevated she does have history of essential hypertension. She is to follow-up with her family doctor within 2 to 3 days for reevaluation and also for blood pressure recheck. She is to get immediate medical attention if any new/worsening symptoms occur. Counseling: The emergency provider has spoken with the patient and discussed todays results, in addition to providing specific details for the plan of care and counseling regarding the diagnosis and prognosis. Questions are answered at this time and they are agreeable with the plan.      --------------------------------- IMPRESSION AND DISPOSITION ---------------------------------    IMPRESSION  1. Diarrhea, unspecified type    2. Elevated blood pressure reading        DISPOSITION  Disposition: Discharge to home  Patient condition is stable      NOTE: This report was transcribed using voice recognition software.  Every effort was made to ensure accuracy; however, inadvertent computerized transcription errors may be present       Cecelia Baird MD  09/07/22 5264

## 2022-09-03 NOTE — DISCHARGE INSTRUCTIONS
NOTE:  As we discussed, try IMODIUM A-D (LIQUID) leaf of diarrhea. Make sure to follow-up with your family doctor in 4 days for reevaluation AND for a BLOOD PRESSURE re-check. Get IMMEDIATE medical attention if any new/worsening symptoms occur!

## 2022-09-03 NOTE — ED NOTES
Department of Emergency Medicine  FIRST PROVIDER TRIAGE NOTE             Independent PABLO           9/2/22  8:55 PM EDT    Date of Encounter: 9/2/22   MRN: [id number]      HPI: Chance Sanchez is a 61 y.o. female who presents to the ED for Diarrhea (Diarrhea x one week. Has been on antibiotic for ear infection. Has been taking antidiarrhea med w/o relief)       ROS: Negative for cp or sob. PE: Gen Appearance/Constitutional: alert  HEENT: NC/NT. PERRLA,  Airway patent. Initial Plan of Care: All treatment areas with department are currently occupied. Plan to order/Initiate the following while awaiting opening in ED: labs, EKG, and imaging studies.   Initiate Treatment-Testing, Proceed toTreatment Area When Bed Available for ED Attending/PABLO to Continue Care    Electronically signed by ALEX Gleason CNP   DD: 9/2/22       ALEX Reveles CNP  09/02/22 8319

## 2022-09-05 LAB — URINE CULTURE, ROUTINE: NORMAL

## 2022-10-18 ENCOUNTER — PREP FOR PROCEDURE (OUTPATIENT)
Dept: SURGERY | Age: 57
End: 2022-10-18

## 2022-10-18 RX ORDER — SODIUM CHLORIDE 0.9 % (FLUSH) 0.9 %
5-40 SYRINGE (ML) INJECTION EVERY 12 HOURS SCHEDULED
Status: CANCELLED | OUTPATIENT
Start: 2022-10-18

## 2022-10-18 RX ORDER — SODIUM CHLORIDE 9 MG/ML
INJECTION, SOLUTION INTRAVENOUS CONTINUOUS
Status: CANCELLED | OUTPATIENT
Start: 2022-10-18

## 2022-10-18 RX ORDER — SODIUM CHLORIDE 0.9 % (FLUSH) 0.9 %
5-40 SYRINGE (ML) INJECTION PRN
Status: CANCELLED | OUTPATIENT
Start: 2022-10-18

## 2022-10-18 RX ORDER — SODIUM CHLORIDE 9 MG/ML
25 INJECTION, SOLUTION INTRAVENOUS PRN
Status: CANCELLED | OUTPATIENT
Start: 2022-10-18

## 2022-10-20 ENCOUNTER — HOSPITAL ENCOUNTER (OUTPATIENT)
Age: 57
Setting detail: OUTPATIENT SURGERY
Discharge: HOME | End: 2022-10-20
Attending: SURGERY | Admitting: SURGERY
Payer: COMMERCIAL

## 2022-10-20 VITALS
DIASTOLIC BLOOD PRESSURE: 85 MMHG | SYSTOLIC BLOOD PRESSURE: 134 MMHG | TEMPERATURE: 97 F | HEART RATE: 78 BPM | OXYGEN SATURATION: 98 % | RESPIRATION RATE: 20 BRPM

## 2022-10-20 PROCEDURE — 3609027000 HC COLONOSCOPY: Performed by: SURGERY

## 2022-10-20 PROCEDURE — 7100000010 HC PHASE II RECOVERY - FIRST 15 MIN: Performed by: SURGERY

## 2022-10-20 PROCEDURE — 6360000002 HC RX W HCPCS: Performed by: SURGERY

## 2022-10-20 PROCEDURE — 2709999900 HC NON-CHARGEABLE SUPPLY: Performed by: SURGERY

## 2022-10-20 PROCEDURE — 7100000011 HC PHASE II RECOVERY - ADDTL 15 MIN: Performed by: SURGERY

## 2022-10-20 PROCEDURE — 99153 MOD SED SAME PHYS/QHP EA: CPT | Performed by: SURGERY

## 2022-10-20 PROCEDURE — 99152 MOD SED SAME PHYS/QHP 5/>YRS: CPT | Performed by: SURGERY

## 2022-10-20 RX ORDER — MIDAZOLAM HYDROCHLORIDE 1 MG/ML
INJECTION INTRAMUSCULAR; INTRAVENOUS PRN
Status: DISCONTINUED | OUTPATIENT
Start: 2022-10-20 | End: 2022-10-20 | Stop reason: ALTCHOICE

## 2022-10-20 RX ORDER — SODIUM CHLORIDE 9 MG/ML
INJECTION, SOLUTION INTRAVENOUS CONTINUOUS
Status: DISCONTINUED | OUTPATIENT
Start: 2022-10-20 | End: 2022-10-20 | Stop reason: HOSPADM

## 2022-10-20 RX ORDER — SODIUM CHLORIDE 0.9 % (FLUSH) 0.9 %
5-40 SYRINGE (ML) INJECTION PRN
Status: DISCONTINUED | OUTPATIENT
Start: 2022-10-20 | End: 2022-10-20 | Stop reason: HOSPADM

## 2022-10-20 RX ORDER — SODIUM CHLORIDE 9 MG/ML
25 INJECTION, SOLUTION INTRAVENOUS PRN
Status: DISCONTINUED | OUTPATIENT
Start: 2022-10-20 | End: 2022-10-20 | Stop reason: HOSPADM

## 2022-10-20 RX ORDER — SODIUM CHLORIDE 0.9 % (FLUSH) 0.9 %
5-40 SYRINGE (ML) INJECTION EVERY 12 HOURS SCHEDULED
Status: DISCONTINUED | OUTPATIENT
Start: 2022-10-20 | End: 2022-10-20 | Stop reason: HOSPADM

## 2022-10-20 RX ORDER — MEPERIDINE HYDROCHLORIDE 50 MG/ML
INJECTION INTRAMUSCULAR; INTRAVENOUS; SUBCUTANEOUS PRN
Status: DISCONTINUED | OUTPATIENT
Start: 2022-10-20 | End: 2022-10-20 | Stop reason: ALTCHOICE

## 2022-10-20 ASSESSMENT — PAIN SCALES - GENERAL: PAINLEVEL_OUTOF10: 0

## 2022-10-20 NOTE — DISCHARGE INSTR - DIET

## 2022-10-20 NOTE — PROGRESS NOTES
Patient given discharge instructions & verbalized understanding. Patient informed to resume meds. Patient's son, Maxine Brandon, called to transport home.

## 2022-10-20 NOTE — PRE SEDATION
Sedation Pre-Procedure Note    Patient Name: Nayeli Bhakta   YOB: 1965  Room/Bed: ENDO POOL ROOM/NONE  Medical Record Number: [id number]  Date: 10/20/2022   Time: 10:38 AM       Indication:  Change in bowel habits    Consent: I have discussed with the patient and/or the patient representative the indication, alternatives, and the possible risks and/or complications of the planned procedure and the anesthesia methods. The patient and/or patient representative appear to understand and agree to proceed. Vital Signs:   Vitals:    10/20/22 0830   BP: (!) 173/78   Pulse: 70   Resp: 18   Temp: 97.5 °F (36.4 °C)   SpO2: 95%       Past Medical History:   has a past medical history of Coccyx pain, Fall, GERD (gastroesophageal reflux disease), Hyperparathyroidism ([address] Hypertension, IBS (irritable bowel syndrome), and OA (osteoarthritis of spine). Past Surgical History:   has a past surgical history that includes Bunionectomy (Bilateral); Colonoscopy (11226957); Foot surgery (Bilateral); Tubal ligation; Upper gastrointestinal endoscopy (06/15/2017); Cholecystectomy; and Colonoscopy (10/16/2017). Medications:   Scheduled Meds:    sodium chloride flush  5-40 mL IntraVENous 2 times per day     Continuous Infusions:    sodium chloride      sodium chloride       PRN Meds: sodium chloride flush, sodium chloride, meperidine, midazolam  Home Meds:   Prior to Admission medications    Medication Sig Start Date End Date Taking? Authorizing Provider   HYDROcodone-acetaminophen (NORCO) 5-325 MG per tablet Take 1 tablet by mouth every 8 hours as needed for Pain for up to 30 days. Intended supply: 30 days 10/12/22 11/11/22  Kwame Haque MD   gabapentin (NEURONTIN) 600 MG tablet Take 1 tablet by mouth 3 times daily for 30 days. 10/12/22 11/11/22  Kwame Haque MD   dicyclomine (BENTYL) 10 MG capsule Take 2 capsules by mouth 4 times daily (before meals and nightly) For abdominal pain/cramping as needed.  9/3/22 9/3/23  Olesya Pickett MD   acetaminophen (TYLENOL) 500 MG tablet Take 2 tablets by mouth every 6 hours as needed for Pain 5/16/19 5/30/19  ALEX Meyers - CNP   oxybutynin (DITROPAN XL) 15 MG extended release tablet TAKE 1 TABLET BY MOUTH DAILY 12/3/18   Billie Guerrero MD   famotidine (PEPCID) 20 MG tablet TAKE 2 TABLETS BY MOUTH TWICE DAILY 11/7/18   Billie Guerrero MD   Cyanocobalamin (VITAMIN B-12 CR PO) Take by mouth    Historical Provider, MD   UREA 20 INTENSIVE HYDRATING 20 % cream APPLY TO FEET BID UTD 3/19/18   Historical Provider, MD   lisinopril (PRINIVIL;ZESTRIL) 10 MG tablet Take 1 tablet by mouth daily 5/8/18   Billie Guerrero MD   cyclobenzaprine (FLEXERIL) 10 MG tablet Take 1 tablet by mouth every 8 hours as needed for Muscle spasms 4/18/18   Sophia Lockett MD   Blood Pressure Monitoring (B-D ASSURE BPM/AUTO ARM CUFF) MISC 1 Device by Does not apply route 2 times daily Check BP daily, keep log  Dx: fluctuating BP  Patient not taking: No sig reported 4/25/17   Christiano Rowell PA-C   Ascorbic Acid (VITAMIN C) 500 MG tablet Take 500 mg by mouth daily. Historical Provider, MD   therapeutic multivitamin-minerals (THERAGRAN-M) tablet Take 1 tablet by mouth daily. Historical Provider, MD     Coumadin Use Last 7 Days:  no  Antiplatelet drug therapy use last 7 days: no  Other anticoagulant use last 7 days: no  Additional Medication Information:  none      Pre-Sedation Documentation and Exam:   I have personally completed a history, physical exam & review of systems for this patient (see notes).     Mallampati Airway Assessment:  normal neck range of motion    Prior History of Anesthesia Complications:   none    ASA Classification:  Class 3 - A patient with severe systemic disease that limits activity but is not incapacitating    Sedation/ Anesthesia Plan:   intravenous sedation    Medications Planned:   midazolam (Versed)  intravenously    Patient is an appropriate candidate for plan of sedation: yes    Electronically signed by Natalia Pereira MD on 10/20/2022 at 10:38 AM

## 2022-10-20 NOTE — DISCHARGE INSTRUCTIONS
Discharge when able  Office visit in 3 weeks    MAY RESUME ALL MEDICATIONS     Colonoscopy: What to Expect at [address] [place] [address]  After a colonoscopy, you'll stay at the clinic until you wake up. Then you can go home. But you'll need to arrange for a ride. Your doctor will tell you when you can eat and do your other usual activities. Your doctor will talk to you about when you'll need your next colonoscopy. Your doctor can help you decide how often you need to be checked. This will depend on the results of your test and your risk for colorectal cancer. After the test, you may be bloated or have gas pains. You may need to pass gas. If a biopsy was done or a polyp was removed, you may have streaks of blood in your stool (feces) for a few days. Problems such as heavy rectal bleeding may not occur until several weeks after the test. This isn't common. But it can happen after polyps are removed. This care sheet gives you a general idea about how long it will take for you to recover. But each person recovers at a different pace. Follow the steps below to get better as quickly as possible. How can you care for yourself at home? Activity    Rest when you feel tired. You can do your normal activities when it feels okay to do so. Diet    Follow your doctor's directions for eating. Unless your doctor has told you not to, drink plenty of fluids. This helps to replace the fluids that were lost during the colon prep. Do not drink alcohol. Medicines    Your doctor will tell you if and when you can restart your medicines. You will also be given instructions about taking any new medicines. If you stopped taking aspirin or some other blood thinner, your doctor will tell you when to start taking it again. If polyps were removed or a biopsy was done during the test, your doctor may tell you not to take aspirin or other anti-inflammatory medicines for a few days.  These include ibuprofen (Advil, Motrin) and naproxen (Aleve). Other instructions    For your safety, do not drive or operate machinery until the medicine wears off and you can think clearly. Your doctor may tell you not to drive or operate machinery until the day after your test.     Do not sign legal documents or make major decisions until the medicine wears off and you can think clearly. The anesthesia can make it hard for you to fully understand what you are agreeing to. Follow-up care is a key part of your treatment and safety. Be sure to make and go to all appointments, and call your doctor if you are having problems. It's also a good idea to know your test results and keep a list of the medicines you take. When should you call for help? Call 911 anytime you think you may need emergency care. For example, call if:    You passed out (lost consciousness). You pass maroon or bloody stools. You have trouble breathing. Call your doctor now or seek immediate medical care if:    You have pain that does not get better after you take pain medicine. You are sick to your stomach or cannot drink fluids. You have new or worse belly pain. You have blood in your stools. You have a fever. You cannot pass stools or gas. Watch closely for changes in your health, and be sure to contact your doctor if you have any problems. Where can you learn more? Go to https://[address]. org and sign in to your [facility] account. Enter E264 in the [facility] box to learn more about \"Colonoscopy: What to Expect at Home. \"     If you do not have an account, please click on the \"Sign Up Now\" link. Current as of: May 4, 2022               Content Version: 13.4  © 3717-8450 Healthwise, Incorporated. Care instructions adapted under license by [facility] [employer] [facility] ([facility]).  If you have questions about a medical condition or this instruction, always ask your healthcare professional. [employer] disclaims any warranty or liability for your use of this information.

## 2022-10-20 NOTE — PROGRESS NOTES
Patient admitted to [facility] & placed on appropriate monitors. Cart low, locked with siderails up. Call light within reach.

## 2022-10-20 NOTE — PROGRESS NOTES
Patient now passing flatus & will be given PO. Belongings returned. Calling patient's son for a ride.

## 2022-10-20 NOTE — BRIEF OP NOTE
Brief Postoperative Note      Patient: Lorenzo Velasco  YOB: 1965  MRN: [id number]    Date of Procedure: 10/20/2022    Pre-Op Diagnosis: Frequent bowel movements [R19.4]    Post-Op Diagnosis: Same and spastic colitis       Procedure(s):  COLONOSCOPY DIAGNOSTIC    Surgeon(s):  Cameron Rosenberg MD    Assistant:  * No surgical staff found *    Anesthesia: IV Sedation    Estimated Blood Loss (mL): Minimal    Complications: None    Specimens:   * No specimens in log *    Implants:  * No implants in log *      Drains: * No LDAs found *    Findings: spastic colitis no mucosal pathology    Electronically signed by Cameron Rosenberg MD on 10/20/2022 at 10:40 AM

## 2022-10-20 NOTE — DISCHARGE INSTR - COC
Continuity of Care Form    Patient Name: Yordan Seals   :  1965  MRN:  [id number]    Admit date:  10/20/2022  Discharge date:  ***    Code Status Order: Full Code   Advance Directives:   Advance Care Flowsheet Documentation       Date/Time Healthcare Directive Type of Healthcare Directive Copy in [address] Agent's Name Healthcare Agent's Phone Number    10/20/22 7758 No, patient does not have an advance directive for healthcare treatment -- -- -- -- --            Admitting Physician:  David Knutson MD  PCP: Ale Carver MD    Discharging Nurse: [facility] Unit/Room#: [address] [facility] Unit Phone Number: ***    Emergency Contact:   Extended Emergency Contact Information  Primary Emergency Contact: Serene Epley  Address: [address] [place] [address] Phone: [phone]  Relation: Child    Past Surgical History:  Past Surgical History:   Procedure Laterality Date    BUNIONECTOMY Bilateral         CHOLECYSTECTOMY      COLONOSCOPY  [id number]    COLONOSCOPY  10/16/2017    FOOT SURGERY Bilateral     muscle removal    TUBAL LIGATION      2003    UPPER GASTROINTESTINAL ENDOSCOPY  06/15/2017       Immunization History:   Immunization History   Administered Date(s) Administered    Influenza Vaccine, unspecified formulation 2017    Influenza Virus Vaccine 10/14/2017    Tdap (Boostrix, Adacel) 2016    Zoster Recombinant (Shingrix) 2018       Active Problems:  Patient Active Problem List   Diagnosis Code    Chronic low back pain M54.50, G89.29    Heartburn R12    Essential hypertension I10    Dyslipidemia E78.5    Urge incontinence of urine N39.41    Spinal stenosis of lumbar region M48.061       Isolation/Infection:   Isolation            No Isolation          Patient Infection Status       Infection Onset Added Last Indicated Last Indicated By Review Planned Expiration Resolved Resolved By    None active    Resolved    C-diff Rule Out 22 Clostridium difficile EIA (Ordered)   22             Nurse Assessment:  Last Vital Signs: BP (!) 173/78   Pulse 70   Temp 97.5 °F (36.4 °C) (Temporal)   Resp 18   LMP 2013   SpO2 95%     Last documented pain score (0-10 scale): Pain Level: 0 (ABDOMINLA DISCOMFORT FROM PREP)  Last Weight:   Wt Readings from Last 1 Encounters:   10/11/22 171 lb (77.6 kg)     Mental Status:  {IP PT MENTAL STATUS:}    IV Access:  { DANISH IV ACCESS:563388622}    Nursing Mobility/ADLs:  Walking   {CHP DME DEJM:869839609}  Transfer  {CHP DME LNRO:994056184}  Bathing  {CHP DME PGJR:978489475}  Dressing  {CHP DME PSEP:749511175}  Toileting  {CHP DME AXGX:270110756}  Feeding  {CHP DME DJEP:369193919}  Med Admin  {CHP DME VLYP:423550313}  Med Delivery   { DANISH MED Delivery:626485717}    Wound Care Documentation and Therapy:        Elimination:  Continence: Bowel: {YES / OZ:09051}  Bladder: {YES / HS:67822}  Urinary Catheter: {Urinary Catheter:520511120}   Colostomy/Ileostomy/Ileal Conduit: {YES / RO:18648}       Date of Last BM: ***  No intake or output data in the 24 hours ending 10/20/22 1042  No intake/output data recorded.     Safety Concerns:     [employer] Safety Concerns:434358447}    Impairments/Disabilities:      [employer] Impairments/Disabilities:998669949}    Nutrition Therapy:  Current Nutrition Therapy:   [employer] Diet List:322381652}    Routes of Feeding: {CHP DME Other Feedings:519343190}  Liquids: {Slp liquid thickness:53002}  Daily Fluid Restriction: {CHP DME Yes amt example:645922564}  Last Modified Barium Swallow with Video (Video Swallowing Test): {Done Not Done REBO:508724725}    Treatments at the Time of Hospital Discharge:   Respiratory Treatments: ***  Oxygen Therapy:  {Therapy; copd oxygen:96602}  Ventilator:    {MH CC Vent [facility]:633236944}    Rehab Therapies: {THERAPEUTIC INTERVENTION:8536728539}  Weight Bearing Status/Restrictions: 50[facility] CC Weight Bearin}  Other Medical Equipment (for information only, NOT a DME order):  {EQUIPMENT:068645828}  Other Treatments: ***    Patient's personal belongings (please select all that are sent with patient):  {CHP DME Belongings:434179820}    RN SIGNATURE:  {Esignature:295976160}    CASE MANAGEMENT/SOCIAL WORK SECTION    Inpatient Status Date: ***    Readmission Risk Assessment Score:  Readmission Risk              Risk of Unplanned Readmission:  0           Discharging to Facility/ Agency   Name:   Address:  Phone:  Fax:    Dialysis Facility (if applicable)   Name:  Address:  Dialysis Schedule:  Phone:  Fax:    / signature: {Esignature:677292370}    PHYSICIAN SECTION    Prognosis: {Prognosis:4569930671}    Condition at Discharge: [address] Patient Condition:850294797}    Rehab Potential (if transferring to Rehab): {Prognosis:8258459550}    Recommended Labs or Other Treatments After Discharge: ***    Physician Certification: I certify the above information and transfer of Pete Crooks  is necessary for the continuing treatment of the diagnosis listed and that she requires {Admit to Appropriate Level of Care:93719} for {GREATER/LESS:553646762} 30 days.      Update Admission H&P: {CHP DME Changes in UTVUE:052559736}    PHYSICIAN SIGNATURE:  {Esignature:092174469}

## 2022-10-21 NOTE — PROCEDURES
[address]                  [address] [facility],  [facility]                                 PROCEDURE NOTE    PATIENT NAME: Moon Dempsey                    :        1965  MED REC NO:   [id number]                            ROOM:  ACCOUNT NO:   [de-identified]                           ADMIT DATE: 10/20/2022  PROVIDER:     Sherice Boss MD    DATE OF PROCEDURE:  10/20/2022    PREOPERATIVE DIAGNOSIS:  Change in bowel habits. POSTOPERATIVE DIAGNOSIS:  Change in bowel habits secondary to spastic  colitis. OPERATION PERFORMED:  Colonoscopy. SURGEON:  Sherice Boss MD    ANESTHESIA:  Procedure was carried out under awake sedation. ESTIMATED BLOOD LOSS:  None. OPERATIVE PROCEDURE:  The patient in the left supine position. Rectal  examination was performed which revealed adequate anal tone. No masses  in the rectal ampulla. Endoscope was introduced and progressed without  difficulty with increased spasticity throughout. However, there was no  mucosal pathology. Endoscope was advanced through the transverse,  ascending, ileocecal valve was well visualized. No mucosal pathology  was noted at any of these level except for increased spasticity. With  the patient in supine position, the endoscope was progressively  withdrawn with similar findings. The patient was informed of findings. Appropriate recommendations were given. The patient will be followed up  on an outpatient basis in my office for further management as needed.         Octavia Henley MD    D: 10/20/2022 12:13:51       T: 10/20/2022 12:16:07     /[email or web address]  Job#: [id number]     Doc#: [id number]    CC:

## 2023-06-01 ENCOUNTER — HOSPITAL ENCOUNTER (EMERGENCY)
Age: 58
Discharge: HOME | End: 2023-06-01
Payer: COMMERCIAL

## 2023-06-01 VITALS
DIASTOLIC BLOOD PRESSURE: 98 MMHG | TEMPERATURE: 98.8 F | BODY MASS INDEX: 31.74 KG/M2 | WEIGHT: 168 LBS | OXYGEN SATURATION: 99 % | RESPIRATION RATE: 16 BRPM | SYSTOLIC BLOOD PRESSURE: 151 MMHG | HEART RATE: 70 BPM

## 2023-06-01 DIAGNOSIS — M77.01 MEDIAL EPICONDYLITIS OF RIGHT ELBOW: Primary | ICD-10-CM

## 2023-06-01 PROCEDURE — 99283 EMERGENCY DEPT VISIT LOW MDM: CPT

## 2023-06-01 RX ORDER — PREDNISONE 20 MG/1
20 TABLET ORAL 2 TIMES DAILY
Qty: 10 TABLET | Refills: 0 | Status: SHIPPED | OUTPATIENT
Start: 2023-06-01 | End: 2023-06-06

## 2023-06-01 ASSESSMENT — PAIN DESCRIPTION - ORIENTATION: ORIENTATION: RIGHT

## 2023-06-01 ASSESSMENT — PAIN - FUNCTIONAL ASSESSMENT: PAIN_FUNCTIONAL_ASSESSMENT: 0-10

## 2023-06-01 ASSESSMENT — PAIN SCALES - GENERAL: PAINLEVEL_OUTOF10: 9

## 2023-06-01 ASSESSMENT — PAIN DESCRIPTION - LOCATION: LOCATION: ARM

## 2023-06-01 NOTE — DISCHARGE INSTRUCTIONS
Pain that you are experiencing in your elbow is related to a tendinitis of the elbow. Please perform the attached exercises a few times a day. You will start prednisone twice daily for the next 5 days. You may also use ice to the area 20 minutes on, 20 minutes off. If your symptoms do not improve with conservative treatment over the next 10 to 14 days please follow-up with orthopedics as listed below.

## 2023-06-06 NOTE — ED PROVIDER NOTES
Independent JULIO Visit. {ED [place]:88708}  Department of Emergency Medicine   ED  Encounter Note  Admit Date/RoomTime: 2023 12:12 PM  ED Room:     NAME: Jose Cruz Braun  : 1965  MRN: [id number]     Chief Complaint:  Arm Pain (Tuesday began with right elbow pain,no injury known. Was seen at chiropractor 1 day ago)    History of Present Illness         Joes Cruz Braun is a 61 y.o. old female who presents to the emergency department {:45213}, for {:88729} {:74674::elbow} pain which occured {:51849} {TIME FRAME:} prior to arrival. The complaint is due to {:87677}. The symptoms are associated with {Symptoms; elbow pain:24790}. {:97730}. She is {:01392}. Since onset the symptoms have been {:03627}. Her pain is aggraveated by {:33014} and relieved by {:48243}. She denies any {:11578}. Tetanus Status: {tetanus status:19135::up to date}. ROS   Pertinent positives and negatives are stated within HPI, all other systems reviewed and are negative. Past Medical History:  has a past medical history of Coccyx pain, Fall, GERD (gastroesophageal reflux disease), Hyperparathyroidism ([address] Hypertension, IBS (irritable bowel syndrome), and OA (osteoarthritis of spine). Surgical History:  has a past surgical history that includes Bunionectomy (Bilateral); Colonoscopy (69449690); Foot surgery (Bilateral); Tubal ligation; Upper gastrointestinal endoscopy (06/15/2017); Cholecystectomy; Colonoscopy (10/16/2017); and Colonoscopy (N/A, 10/20/2022). Social History:  reports that she has never smoked. She has never used smokeless tobacco. She reports current alcohol use. She reports that she does not use drugs. Family History: family history includes Colon Cancer in her father; High Blood Pressure in her father and mother; Liver Cancer in her father; Other in her father and mother; Prostate Cancer in her father.      Allergies: Anaprox [naproxen], Motrin [ibuprofen], and Nsaids    Physical Exam
care and counseling regarding the diagnosis and prognosis. Questions are answered at this time and are agreeable with the plan. Assessment      1. Medial epicondylitis of right elbow      Plan   Discharged home. Patient condition is good    New Medications     Discharge Medication List as of 6/1/2023  1:01 PM        START taking these medications    Details   predniSONE (DELTASONE) 20 MG tablet Take 1 tablet by mouth 2 times daily for 5 days, Disp-10 tablet, R-0Normal           Electronically signed by ALEX Roberson CNP   DD: 6/6/23  **This report was transcribed using voice recognition software. Every effort was made to ensure accuracy; however, inadvertent computerized transcription errors may be present.   END OF ED PROVIDER NOTE      ALEX Roberson CNP  06/08/23 4686

## 2024-04-14 ENCOUNTER — HOSPITAL ENCOUNTER (EMERGENCY)
Age: 59
Discharge: HOME | End: 2024-04-14
Attending: EMERGENCY MEDICINE
Payer: COMMERCIAL

## 2024-04-14 ENCOUNTER — APPOINTMENT (OUTPATIENT)
Dept: GENERAL RADIOLOGY | Age: 59
End: 2024-04-14
Payer: COMMERCIAL

## 2024-04-14 VITALS
SYSTOLIC BLOOD PRESSURE: 143 MMHG | DIASTOLIC BLOOD PRESSURE: 81 MMHG | RESPIRATION RATE: 18 BRPM | TEMPERATURE: 97.8 F | OXYGEN SATURATION: 96 % | HEART RATE: 58 BPM

## 2024-04-14 DIAGNOSIS — M65.9 TENOSYNOVITIS OF LEFT HAND: ICD-10-CM

## 2024-04-14 DIAGNOSIS — L03.114 CELLULITIS OF LEFT HAND: Primary | ICD-10-CM

## 2024-04-14 PROCEDURE — 73110 X-RAY EXAM OF WRIST: CPT

## 2024-04-14 PROCEDURE — 99283 EMERGENCY DEPT VISIT LOW MDM: CPT

## 2024-04-14 RX ORDER — AMOXICILLIN AND CLAVULANATE POTASSIUM 875; 125 MG/1; MG/1
1 TABLET, FILM COATED ORAL 2 TIMES DAILY
Qty: 20 TABLET | Refills: 0 | Status: SHIPPED | OUTPATIENT
Start: 2024-04-14 | End: 2024-04-24

## 2024-04-14 RX ORDER — METHYLPREDNISOLONE 4 MG/1
TABLET ORAL
Qty: 1 KIT | Refills: 0 | Status: SHIPPED | OUTPATIENT
Start: 2024-04-14 | End: 2024-04-20

## 2024-04-14 ASSESSMENT — LIFESTYLE VARIABLES
HOW OFTEN DO YOU HAVE A DRINK CONTAINING ALCOHOL: NEVER
HOW MANY STANDARD DRINKS CONTAINING ALCOHOL DO YOU HAVE ON A TYPICAL DAY: PATIENT DOES NOT DRINK

## 2024-04-14 ASSESSMENT — PAIN DESCRIPTION - LOCATION: LOCATION: HAND

## 2024-04-14 ASSESSMENT — PAIN DESCRIPTION - PAIN TYPE: TYPE: ACUTE PAIN

## 2024-04-14 ASSESSMENT — PAIN DESCRIPTION - ORIENTATION: ORIENTATION: LEFT

## 2024-04-14 ASSESSMENT — PAIN DESCRIPTION - DESCRIPTORS: DESCRIPTORS: SORE;TENDER;DULL

## 2024-04-14 ASSESSMENT — PAIN DESCRIPTION - FREQUENCY: FREQUENCY: CONTINUOUS

## 2024-04-14 ASSESSMENT — PAIN SCALES - GENERAL: PAINLEVEL_OUTOF10: 10

## 2024-04-14 ASSESSMENT — PAIN DESCRIPTION - ONSET: ONSET: ON-GOING

## 2024-04-14 ASSESSMENT — PAIN - FUNCTIONAL ASSESSMENT: PAIN_FUNCTIONAL_ASSESSMENT: 0-10

## 2024-04-14 NOTE — ED PROVIDER NOTES
obtained during this visit were within normal range or not returned as of this dictation.          RADIOLOGY:   Non-plain film images such as CT, Ultrasound, Xray and MRI are read by the radiologist.    Interpretation per the Radiologist below, if available at the time of this note:    XR WRIST LEFT (MIN 3 VIEWS)   Final Result   Normal wrist radiographs           No results found.    No results found.    PROCEDURES   none          CRITICAL CARE TIME (.cct)   none    PAST MEDICAL HISTORY/Chronic Conditions Affecting Care      has a past medical history of Coccyx pain, Fall, GERD (gastroesophageal reflux disease), Hyperparathyroidism (HCC) (11/13/14), Hypertension, IBS (irritable bowel syndrome), and OA (osteoarthritis of spine).     EMERGENCY DEPARTMENT COURSE    Vitals:    Vitals:    04/14/24 1109   BP: (!) 143/81   Pulse: 58   Resp: 18   Temp: 97.8 °F (36.6 °C)   TempSrc: Oral   SpO2: 96%       Patient was given the following medications:  Medications - No data to display        Is this patient to be included in the SEP-1 Core Measure due to severe sepsis or septic shock?   No Exclusion criteria - the patient is NOT to be included for SEP-1 Core Measure due to: 2+ SIRS criteria are not met        Medical Decision Making/Differential Diagnosis:    CC/HPI Summary, Social Determinants of health, Records Reviewed, DDx, testing done/not done, ED Course, Reassessment, disposition considerations/shared decision making with patient, consults, disposition:            MDM:   Potential for cellulitis given redness on the back of the hand and so we will treat with Augmentin.  Also potential for tenosynovitis so we will treat with steroid pack and wrist brace.  Will follow-up with PCP tomorrow and Ortho for hand follow-up.  She is neurovascularly intact.  Small Velcro wrist splint applied by nurse to use for comfort.  Patient was explicitly instructed on specific signs and symptoms on which to return to the emergency room for.

## 2025-07-09 ENCOUNTER — HOSPITAL ENCOUNTER (EMERGENCY)
Age: 60
Discharge: HOME | End: 2025-07-09

## 2025-07-09 ENCOUNTER — APPOINTMENT (OUTPATIENT)
Dept: CT IMAGING | Age: 60
End: 2025-07-09

## 2025-07-09 ENCOUNTER — APPOINTMENT (OUTPATIENT)
Dept: GENERAL RADIOLOGY | Age: 60
End: 2025-07-09

## 2025-07-09 VITALS
HEART RATE: 68 BPM | DIASTOLIC BLOOD PRESSURE: 115 MMHG | OXYGEN SATURATION: 95 % | RESPIRATION RATE: 16 BRPM | TEMPERATURE: 98.7 F | SYSTOLIC BLOOD PRESSURE: 172 MMHG

## 2025-07-09 DIAGNOSIS — M43.02 CERVICAL SPONDYLOLYSIS: ICD-10-CM

## 2025-07-09 DIAGNOSIS — M25.421 EFFUSION OF JOINT OF RIGHT UPPER ARM: ICD-10-CM

## 2025-07-09 DIAGNOSIS — M25.521 RIGHT ELBOW PAIN: Primary | ICD-10-CM

## 2025-07-09 PROCEDURE — 73070 X-RAY EXAM OF ELBOW: CPT

## 2025-07-09 PROCEDURE — 72125 CT NECK SPINE W/O DYE: CPT

## 2025-07-09 PROCEDURE — 73080 X-RAY EXAM OF ELBOW: CPT

## 2025-07-09 PROCEDURE — 6360000002 HC RX W HCPCS

## 2025-07-09 PROCEDURE — 73030 X-RAY EXAM OF SHOULDER: CPT

## 2025-07-09 PROCEDURE — 99284 EMERGENCY DEPT VISIT MOD MDM: CPT

## 2025-07-09 RX ORDER — DEXAMETHASONE SODIUM PHOSPHATE 10 MG/ML
10 INJECTION, SOLUTION INTRAMUSCULAR; INTRAVENOUS ONCE
Status: COMPLETED | OUTPATIENT
Start: 2025-07-09 | End: 2025-07-09

## 2025-07-09 RX ORDER — METHYLPREDNISOLONE 4 MG/1
TABLET ORAL
Qty: 1 KIT | Refills: 0 | Status: SHIPPED | OUTPATIENT
Start: 2025-07-09 | End: 2025-07-15

## 2025-07-09 RX ADMIN — DEXAMETHASONE SODIUM PHOSPHATE 10 MG: 10 INJECTION, SOLUTION INTRAMUSCULAR; INTRAVENOUS at 14:43

## 2025-07-09 ASSESSMENT — LIFESTYLE VARIABLES
HOW MANY STANDARD DRINKS CONTAINING ALCOHOL DO YOU HAVE ON A TYPICAL DAY: PATIENT DOES NOT DRINK
HOW OFTEN DO YOU HAVE A DRINK CONTAINING ALCOHOL: NEVER

## 2025-07-09 NOTE — ED PROVIDER NOTES
***        [facility] EMERGENCY DEPARTMENT  EMERGENCY DEPARTMENT ENCOUNTER        Pt Name: Georgia Pickard  MRN: [id number]  Birthdate 1965  Date of evaluation: 7/9/2025  Provider: ALEX Gomes CNP  PCP: Mamie Kearney MD  Note Started: 2:51 PM EDT 7/9/25    CHIEF COMPLAINT       Chief Complaint   Patient presents with    Arm Pain     R arm pain and numbness/tingling, pt states she is unable to lift arm above shoulder started 2 days ago . Denies injury        HISTORY OF PRESENT ILLNESS: 1 or more Elements   History from : Patient and chart review  Limitations to history : None    Georgia Pickard is a 59 y.o. female with a history of cervical spinal stenosis, GERD, hyperparathyroidism, hypertension, IBS, and osteoarthritis of the spine who presents to the emergency department by private vehicle, alone, for evaluation of moderate to severe non-traumatic sharp aching right arm pain which occured 2 day(s) prior to arrival.  The complaint is due to no known cause. Pain starts in her anterior shoulder and radiates down her arm into her hand with tingling from mid upper arm into all fingers of the right hand. Since onset the symptoms have been gradually worsening.  Her pain is aggraveated by certain movements, especially raising the arm, and  pressure on the shoulder and elbow. Pain is relieved by nothing despite taking Norco and gabapentin that is prescribed for another condition. She denies any headache, neck pain, extremity injury, numbness, weakness, nausea, fever, chills, wounds, rash, or changes in color or temperature of the extremity. Patient has no prior history of pain/injury with regards to today's visit. She is right handed.     Nursing Notes were all reviewed and agreed with or any disagreements were addressed in the HPI.    REVIEW OF SYSTEMS :      Review of Systems    Positives and Pertinent negatives as per HPI.     PAST MEDICAL HISTORY    has a past medical history

## (undated) DEVICE — DEFENDO AIR WATER SUCTION AND BIOPSY VALVE KIT FOR  OLYMPUS: Brand: DEFENDO AIR/WATER/SUCTION AND BIOPSY VALVE

## (undated) DEVICE — SPONGE GZ W4XL4IN RAYON POLY FILL CVR W/ NONWOVEN FAB

## (undated) DEVICE — CONNECTOR IRRIGATION AUXILIARY H2O JET W/ PRT MTL THRD HYDR

## (undated) DEVICE — Z DISCONTINUED NO SUB IDED TUBING ETCO2 AD L6.5FT NSL ORAL CVD PRNG NONFLARED TIP OVR